# Patient Record
Sex: FEMALE | Race: WHITE | NOT HISPANIC OR LATINO | Employment: OTHER | URBAN - METROPOLITAN AREA
[De-identification: names, ages, dates, MRNs, and addresses within clinical notes are randomized per-mention and may not be internally consistent; named-entity substitution may affect disease eponyms.]

---

## 2017-09-15 ENCOUNTER — OUTSIDE FACILITY SERVICE (OUTPATIENT)
Dept: GASTROENTEROLOGY | Facility: CLINIC | Age: 77
End: 2017-09-15

## 2017-09-15 ENCOUNTER — LAB REQUISITION (OUTPATIENT)
Dept: LAB | Facility: HOSPITAL | Age: 77
End: 2017-09-15

## 2017-09-15 DIAGNOSIS — D12.5 BENIGN NEOPLASM OF SIGMOID COLON: ICD-10-CM

## 2017-09-15 PROCEDURE — 88305 TISSUE EXAM BY PATHOLOGIST: CPT | Performed by: INTERNAL MEDICINE

## 2017-09-15 PROCEDURE — 45385 COLONOSCOPY W/LESION REMOVAL: CPT | Performed by: INTERNAL MEDICINE

## 2017-09-18 LAB
CYTO UR: NORMAL
LAB AP CASE REPORT: NORMAL
LAB AP CLINICAL INFORMATION: NORMAL
Lab: NORMAL
PATH REPORT.FINAL DX SPEC: NORMAL
PATH REPORT.GROSS SPEC: NORMAL

## 2017-09-19 ENCOUNTER — TELEPHONE (OUTPATIENT)
Dept: GASTROENTEROLOGY | Facility: CLINIC | Age: 77
End: 2017-09-19

## 2017-09-19 NOTE — TELEPHONE ENCOUNTER
----- Message from Yobani Hawk MD sent at 9/18/2017  4:40 PM EDT -----  Let Mrs. Craig know there was one adenoma type polyp.  She should have a repeat exam in 5 years.  Thank you,  KATHY

## 2017-09-22 ENCOUNTER — TRANSCRIBE ORDERS (OUTPATIENT)
Dept: ADMINISTRATIVE | Facility: HOSPITAL | Age: 77
End: 2017-09-22

## 2017-09-22 DIAGNOSIS — Z12.31 VISIT FOR SCREENING MAMMOGRAM: Primary | ICD-10-CM

## 2017-10-03 ENCOUNTER — HOSPITAL ENCOUNTER (OUTPATIENT)
Dept: MAMMOGRAPHY | Facility: HOSPITAL | Age: 77
Discharge: HOME | End: 2017-10-03
Attending: OBSTETRICS & GYNECOLOGY | Admitting: OBSTETRICS & GYNECOLOGY

## 2017-10-03 DIAGNOSIS — Z12.31 VISIT FOR SCREENING MAMMOGRAM: ICD-10-CM

## 2017-10-03 PROCEDURE — 77063 BREAST TOMOSYNTHESIS BI: CPT

## 2017-10-03 PROCEDURE — 77063 BREAST TOMOSYNTHESIS BI: CPT | Performed by: RADIOLOGY

## 2017-10-03 PROCEDURE — G0202 SCR MAMMO BI INCL CAD: HCPCS | Performed by: RADIOLOGY

## 2017-10-03 PROCEDURE — G0202 SCR MAMMO BI INCL CAD: HCPCS

## 2017-10-12 ENCOUNTER — OFFICE VISIT (OUTPATIENT)
Dept: GYNECOLOGIC ONCOLOGY | Facility: CLINIC | Age: 77
End: 2017-10-12

## 2017-10-12 VITALS
TEMPERATURE: 98 F | DIASTOLIC BLOOD PRESSURE: 80 MMHG | HEIGHT: 64 IN | WEIGHT: 152 LBS | OXYGEN SATURATION: 94 % | SYSTOLIC BLOOD PRESSURE: 172 MMHG | HEART RATE: 70 BPM | BODY MASS INDEX: 25.95 KG/M2 | RESPIRATION RATE: 14 BRPM

## 2017-10-12 DIAGNOSIS — R87.610 PAPANICOLAOU SMEAR OF CERVIX WITH ATYPICAL SQUAMOUS CELLS OF UNDETERMINED SIGNIFICANCE (ASC-US): ICD-10-CM

## 2017-10-12 DIAGNOSIS — Z01.419 WELL FEMALE EXAM WITH ROUTINE GYNECOLOGICAL EXAM: ICD-10-CM

## 2017-10-12 PROCEDURE — G0101 CA SCREEN;PELVIC/BREAST EXAM: HCPCS | Performed by: NURSE PRACTITIONER

## 2017-10-12 RX ORDER — LEVOTHYROXINE SODIUM 88 UG/1
TABLET ORAL
COMMUNITY
Start: 2017-08-30 | End: 2020-01-02

## 2017-10-12 RX ORDER — VALSARTAN 160 MG/1
TABLET ORAL
COMMUNITY
Start: 2017-08-30 | End: 2019-08-06 | Stop reason: ALTCHOICE

## 2017-10-12 RX ORDER — AMLODIPINE BESYLATE 5 MG/1
TABLET ORAL
COMMUNITY
Start: 2017-09-25 | End: 2019-09-17
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
## 2018-08-23 ENCOUNTER — TRANSCRIBE ORDERS (OUTPATIENT)
Dept: ADMINISTRATIVE | Facility: HOSPITAL | Age: 78
End: 2018-08-23

## 2018-08-23 DIAGNOSIS — Z12.31 VISIT FOR SCREENING MAMMOGRAM: Primary | ICD-10-CM

## 2018-10-04 ENCOUNTER — HOSPITAL ENCOUNTER (OUTPATIENT)
Dept: MAMMOGRAPHY | Facility: HOSPITAL | Age: 78
Discharge: HOME | End: 2018-10-04
Admitting: NURSE PRACTITIONER

## 2018-10-04 DIAGNOSIS — Z12.31 VISIT FOR SCREENING MAMMOGRAM: ICD-10-CM

## 2018-10-04 PROCEDURE — 77067 SCR MAMMO BI INCL CAD: CPT

## 2018-10-04 PROCEDURE — 77067 SCR MAMMO BI INCL CAD: CPT | Performed by: RADIOLOGY

## 2018-10-04 PROCEDURE — 77063 BREAST TOMOSYNTHESIS BI: CPT | Performed by: RADIOLOGY

## 2018-10-04 PROCEDURE — 77063 BREAST TOMOSYNTHESIS BI: CPT

## 2018-10-31 ENCOUNTER — OFFICE VISIT (OUTPATIENT)
Dept: GYNECOLOGIC ONCOLOGY | Facility: CLINIC | Age: 78
End: 2018-10-31

## 2018-10-31 VITALS
DIASTOLIC BLOOD PRESSURE: 77 MMHG | BODY MASS INDEX: 27.82 KG/M2 | WEIGHT: 157 LBS | HEIGHT: 63 IN | RESPIRATION RATE: 16 BRPM | HEART RATE: 64 BPM | SYSTOLIC BLOOD PRESSURE: 178 MMHG | TEMPERATURE: 97.9 F | OXYGEN SATURATION: 96 %

## 2018-10-31 DIAGNOSIS — R87.610 PAPANICOLAOU SMEAR OF CERVIX WITH ATYPICAL SQUAMOUS CELLS OF UNDETERMINED SIGNIFICANCE (ASC-US): ICD-10-CM

## 2018-10-31 DIAGNOSIS — Z01.419 WELL WOMAN EXAM WITH ROUTINE GYNECOLOGICAL EXAM: Primary | ICD-10-CM

## 2018-10-31 DIAGNOSIS — N95.2 VAGINAL ATROPHY: ICD-10-CM

## 2018-10-31 PROCEDURE — G0101 CA SCREEN;PELVIC/BREAST EXAM: HCPCS | Performed by: NURSE PRACTITIONER

## 2018-10-31 RX ORDER — SODIUM CHLORIDE 5 %
1 OINTMENT (GRAM) OPHTHALMIC (EYE) NIGHTLY
COMMUNITY
End: 2019-11-11

## 2019-08-06 ENCOUNTER — OFFICE VISIT (OUTPATIENT)
Dept: ORTHOPEDIC SURGERY | Facility: CLINIC | Age: 79
End: 2019-08-06

## 2019-08-06 VITALS — HEIGHT: 63 IN | OXYGEN SATURATION: 99 % | BODY MASS INDEX: 26.45 KG/M2 | WEIGHT: 149.25 LBS | HEART RATE: 65 BPM

## 2019-08-06 DIAGNOSIS — M25.551 RIGHT HIP PAIN: Primary | ICD-10-CM

## 2019-08-06 PROCEDURE — 99204 OFFICE O/P NEW MOD 45 MIN: CPT | Performed by: ORTHOPAEDIC SURGERY

## 2019-08-06 RX ORDER — OLMESARTAN MEDOXOMIL 40 MG/1
40 TABLET ORAL DAILY
Refills: 0 | COMMUNITY
Start: 2019-07-16
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
## 2019-08-15 ENCOUNTER — OFFICE VISIT (OUTPATIENT)
Dept: ORTHOPEDIC SURGERY | Facility: CLINIC | Age: 79
End: 2019-08-15

## 2019-08-15 ENCOUNTER — HOSPITAL ENCOUNTER (OUTPATIENT)
Dept: MRI IMAGING | Facility: HOSPITAL | Age: 79
Discharge: HOME | End: 2019-08-15
Admitting: ORTHOPAEDIC SURGERY

## 2019-08-15 VITALS — WEIGHT: 149.03 LBS | BODY MASS INDEX: 26.41 KG/M2 | HEART RATE: 79 BPM | OXYGEN SATURATION: 97 % | HEIGHT: 63 IN

## 2019-08-15 DIAGNOSIS — M25.551 RIGHT HIP PAIN: ICD-10-CM

## 2019-08-15 DIAGNOSIS — M70.61 TROCHANTERIC BURSITIS OF RIGHT HIP: Primary | ICD-10-CM

## 2019-08-15 PROCEDURE — 73720 MRI LWR EXTREMITY W/O&W/DYE: CPT

## 2019-08-15 PROCEDURE — 20610 DRAIN/INJ JOINT/BURSA W/O US: CPT | Performed by: ORTHOPAEDIC SURGERY

## 2019-08-15 PROCEDURE — A9577 INJ MULTIHANCE: HCPCS | Performed by: ORTHOPAEDIC SURGERY

## 2019-08-15 PROCEDURE — 82565 ASSAY OF CREATININE: CPT

## 2019-08-15 PROCEDURE — 99213 OFFICE O/P EST LOW 20 MIN: CPT | Performed by: ORTHOPAEDIC SURGERY

## 2019-08-15 PROCEDURE — 0 GADOBENATE DIMEGLUMINE 529 MG/ML SOLUTION: Performed by: ORTHOPAEDIC SURGERY

## 2019-08-15 RX ORDER — TRIAMCINOLONE ACETONIDE 40 MG/ML
40 INJECTION, SUSPENSION INTRA-ARTICULAR; INTRAMUSCULAR
Status: COMPLETED | OUTPATIENT
Start: 2019-08-15 | End: 2019-08-15

## 2019-08-15 RX ADMIN — GADOBENATE DIMEGLUMINE 14 ML: 529 INJECTION, SOLUTION INTRAVENOUS at 09:58

## 2019-08-15 RX ADMIN — TRIAMCINOLONE ACETONIDE 40 MG: 40 INJECTION, SUSPENSION INTRA-ARTICULAR; INTRAMUSCULAR at 14:52
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
## 2019-08-15 NOTE — PROGRESS NOTES
Procedure   Large Joint Arthrocentesis: R greater trochanteric bursa  Date/Time: 8/15/2019 2:52 PM  Consent given by: patient  Site marked: site marked  Timeout: Immediately prior to procedure a time out was called to verify the correct patient, procedure, equipment, support staff and site/side marked as required   Supporting Documentation  Indications: pain   Procedure Details  Location: hip - R greater trochanteric bursa  Preparation: Patient was prepped and draped in the usual sterile fashion  Needle size: 22 G  Approach: lateral  Medications administered: 40 mg triamcinolone acetonide 40 MG/ML (5 cc Lidocaine 1% NDC: 03897-566-52; LOT: [id number]; EXP: 12/01/2021)  Patient tolerance: patient tolerated the procedure well with no immediate complications

## 2019-08-16 ENCOUNTER — APPOINTMENT (OUTPATIENT)
Dept: MRI IMAGING | Facility: HOSPITAL | Age: 79
End: 2019-08-16

## 2019-08-20 LAB — CREAT BLDA-MCNC: 0.9 MG/DL (ref 0.6–1.3)

## 2019-09-11 ENCOUNTER — TRANSCRIBE ORDERS (OUTPATIENT)
Dept: ADMINISTRATIVE | Facility: HOSPITAL | Age: 79
End: 2019-09-11

## 2019-09-11 DIAGNOSIS — Z12.31 VISIT FOR SCREENING MAMMOGRAM: Primary | ICD-10-CM

## 2019-09-17 ENCOUNTER — OFFICE VISIT (OUTPATIENT)
Dept: ORTHOPEDIC SURGERY | Facility: CLINIC | Age: 79
End: 2019-09-17

## 2019-09-17 VITALS — OXYGEN SATURATION: 97 % | HEIGHT: 63 IN | HEART RATE: 66 BPM | BODY MASS INDEX: 26.58 KG/M2 | WEIGHT: 150 LBS

## 2019-09-17 DIAGNOSIS — M25.551 RIGHT HIP PAIN: ICD-10-CM

## 2019-09-17 DIAGNOSIS — M70.61 TROCHANTERIC BURSITIS OF RIGHT HIP: Primary | ICD-10-CM

## 2019-09-17 DIAGNOSIS — M17.11 PRIMARY OSTEOARTHRITIS OF RIGHT KNEE: ICD-10-CM

## 2019-09-17 PROCEDURE — 99213 OFFICE O/P EST LOW 20 MIN: CPT | Performed by: ORTHOPAEDIC SURGERY

## 2019-09-17 RX ORDER — AMLODIPINE BESYLATE 10 MG/1
10 TABLET ORAL DAILY
Refills: 1 | COMMUNITY
Start: 2019-08-19
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
## 2019-11-11 ENCOUNTER — HOSPITAL ENCOUNTER (OUTPATIENT)
Dept: MAMMOGRAPHY | Facility: HOSPITAL | Age: 79
Discharge: HOME | End: 2019-11-11
Admitting: NURSE PRACTITIONER

## 2019-11-11 ENCOUNTER — OFFICE VISIT (OUTPATIENT)
Dept: GYNECOLOGIC ONCOLOGY | Facility: CLINIC | Age: 79
End: 2019-11-11

## 2019-11-11 VITALS
BODY MASS INDEX: 27.29 KG/M2 | DIASTOLIC BLOOD PRESSURE: 77 MMHG | HEART RATE: 70 BPM | OXYGEN SATURATION: 96 % | TEMPERATURE: 97.9 F | RESPIRATION RATE: 16 BRPM | SYSTOLIC BLOOD PRESSURE: 164 MMHG | WEIGHT: 154 LBS | HEIGHT: 63 IN

## 2019-11-11 DIAGNOSIS — Z01.419 WELL FEMALE EXAM WITH ROUTINE GYNECOLOGICAL EXAM: Primary | ICD-10-CM

## 2019-11-11 DIAGNOSIS — N95.2 VAGINAL ATROPHY: ICD-10-CM

## 2019-11-11 DIAGNOSIS — R87.610 PAPANICOLAOU SMEAR OF CERVIX WITH ATYPICAL SQUAMOUS CELLS OF UNDETERMINED SIGNIFICANCE (ASC-US): ICD-10-CM

## 2019-11-11 DIAGNOSIS — Z91.89 OTHER SPECIFIED PERSONAL RISK FACTORS, NOT ELSEWHERE CLASSIFIED: ICD-10-CM

## 2019-11-11 DIAGNOSIS — Z12.31 VISIT FOR SCREENING MAMMOGRAM: ICD-10-CM

## 2019-11-11 PROCEDURE — 77063 BREAST TOMOSYNTHESIS BI: CPT

## 2019-11-11 PROCEDURE — G0101 CA SCREEN;PELVIC/BREAST EXAM: HCPCS | Performed by: NURSE PRACTITIONER

## 2019-11-11 PROCEDURE — 77067 SCR MAMMO BI INCL CAD: CPT | Performed by: RADIOLOGY

## 2019-11-11 PROCEDURE — 77063 BREAST TOMOSYNTHESIS BI: CPT | Performed by: RADIOLOGY

## 2019-11-11 PROCEDURE — 77067 SCR MAMMO BI INCL CAD: CPT

## 2019-11-11 NOTE — PROGRESS NOTES
GYN ONCOLOGY ANNUAL WELL WOMAN VISIT      Kaylan Craig  [id number]  1940      Chief Complaint: Annual Exam (no complaints)        History of present illness:  Kaylan Craig is a 79 y.o. year old female who is here today for an annual exam. She has a personal history of abnormal pap smears and desires to continue annual paps. She recently had cornea transplant at  and has been receiving injections for her macular degeneration. She developed an infection in her left eye of unknown etiology, resolving with treatment. Otherwise, she is feeling very well today and has no gyn complaints. She denies vaginal bleeding, pelvic pain, changes in bowel or bladder function, new or concerning lesions, and breast problems. All her well woman screenings are UTD.           Obstetric History:  OB History      Para Term  AB Living    3 3 3     3    SAB TAB Ectopic Molar Multiple Live Births                        Menstrual History:     No LMP recorded (lmp unknown). Patient is postmenopausal.          Past Medical History:   Diagnosis Date   • Anemia     Blood Loss requiring blood transfusions during her hospitalizatoin at .    • Arthritis    • ASCUS favor benign    • Atrophic vaginitis    • Disease of thyroid gland    • Hypertension    • Leg pain    • Macular degeneration of both eyes     shots in both eyes monthly   • Seasonal allergies    • Wears glasses        Past Surgical History:   Procedure Laterality Date   • OTHER SURGICAL HISTORY      BTL   • OTHER SURGICAL HISTORY Bilateral     VAG LESION   • OTHER SURGICAL HISTORY      CORNEA X4, 2 IN LEFT AND 2 IN RIGHT   • OTHER SURGICAL HISTORY      Reported Corrective Surg to feet    • POSTERIOR REPAIR     • THYROIDECTOMY N/A 2016    Procedure: THYROIDECTOMY-TOTAL;  Surgeon: Nirmal JONES MD;  Location: [facility];  Service:    • TONSILLECTOMY AND ADENOIDECTOMY     • TOTAL ABDOMINAL HYSTERECTOMY WITH SALPINGO OOPHORECTOMY      For Uterine Sarcoma   "      MEDICATIONS: The current medication list was reviewed and reconciled.     Allergies:  has No Known Allergies.    Family History   Problem Relation Age of Onset   • Hypertension Father    • Prostate cancer Father    • Ovarian cancer Neg Hx    • Breast cancer Neg Hx        Health Maintenance:  Last mammogram was 10/4/2018. Last colonoscopy was 2017, with recommended follow-up in 5 year(s). Last DEXA was 9/2019. Last pap smear was 10/2018, results were  normal PAP..      Review of Systems   Constitutional: Negative for fatigue, fever and unexpected weight change.   HENT: Negative for congestion, ear pain, hearing loss, sinus pressure and trouble swallowing.    Eyes: Negative for visual disturbance.   Respiratory: Negative for cough, chest tightness, shortness of breath and wheezing.    Cardiovascular: Negative for chest pain, palpitations and leg swelling.   Gastrointestinal: Negative for abdominal distention, abdominal pain, constipation, diarrhea, nausea and vomiting.   Endocrine: Negative for cold intolerance, heat intolerance, polydipsia, polyphagia and polyuria.   Genitourinary: Negative for difficulty urinating, dyspareunia, dysuria, frequency, hematuria, pelvic pain, urgency, vaginal bleeding, vaginal discharge and vaginal pain.   Musculoskeletal: Negative for arthralgias, gait problem, joint swelling and myalgias.   Skin: Negative for color change, pallor and rash.   Neurological: Negative for dizziness, seizures, syncope, weakness, light-headedness, numbness and headaches.   Hematological: Negative for adenopathy. Does not bruise/bleed easily.   Psychiatric/Behavioral: Negative for agitation, confusion, sleep disturbance and suicidal ideas. The patient is not nervous/anxious.        Physical Exam  Vital Signs: /77   Pulse 70   Temp 97.9 °F (36.6 °C) (Temporal)   Resp 16   Ht 160 cm (63\")   Wt 69.9 kg (154 lb)   LMP  (LMP Unknown)   SpO2 96%   BMI 27.28 kg/m²   Pain Score    11/11/19 0944 "   PainSc: 0-No pain      General Appearance:  alert, cooperative, no apparent distress and appears stated age   Neurologic/Psychiatric: A&O x 3, gait steady, appropriate affect   HEENT:  Normocephalic, without obvious abnormality, mucous membranes moist   Neck: Supple, symmetrical, trachea midline, no adenopathy;  No thyromegaly, masses, or tenderness   Back:   Symmetric, no curvature, ROM normal, no CVA tenderness   Lungs:   Clear to auscultation bilaterally; respirations regular, even, and unlabored bilaterally   Heart:  Regular rate and rhythm, no murmurs appreciated   Breasts:  Symmetrical, no masses, no lesions and no nipple discharge   Abdomen:   Soft, non-tender, non-distended and no organomegaly   Lymph nodes: No cervical, supraclavicular, inguinal or axillary adenopathy noted   Extremities: Normal, atraumatic; no clubbing, cyanosis, or edema    Skin: No rashes, ulcers, or suspicious lesions noted   Pelvic: External Genitalia  atrophic, without lesions  Vagina  is pale, atrophic.   Cervix  normal, without lesions, no discharge, no CMT and pap smear obtained  Uterus  normal size, midposition, mobile and nontender  Ovaries  without palpable masses or fullness  Parametria  smooth  Rectovaginal  Female rectovaginal: confirms no masses or bleeding and Hemoccult negative       Procedure Note:  No notes on file    Assessment and Plan:    Kaylan was seen today for annual exam.    Diagnoses and all orders for this visit:    Well female exam with routine gynecological exam    Papanicolaou smear of cervix with atypical squamous cells of undetermined significance (ASC-US)  -     Pap IG, Rfx HPV ASCU; Future    Vaginal atrophy  -     conjugated estrogens (PREMARIN) 0.625 MG/GM vaginal cream; Insert  into the vagina 3 (Three) Times a Week.        Pap was done today. If she does not receive the results of the Pap within 2 weeks  time, she was instructed to call to find out the results.      She was encouraged to get yearly  mammograms.  She should report any palpable breast lump(s) or skin changes regardless of mammographic findings.  I explained to Kaylan that notification regarding her mammogram results will come from the center performing the study.  Our office will not be routinely calling with mammogram results.  It is her responsibility to make sure that the results from the mammogram are communicated to her by the breast center.  If she has any questions about the results, she is welcome to call our office anytime.    DEXA due 2022.    Colonoscopy due 2022.      Return to clinic in 1 year for Annual exam.      Karen Kaufman, APRN

## 2020-01-02 ENCOUNTER — OFFICE VISIT (OUTPATIENT)
Dept: ORTHOPEDIC SURGERY | Facility: CLINIC | Age: 80
End: 2020-01-02

## 2020-01-02 VITALS — BODY MASS INDEX: 26.82 KG/M2 | OXYGEN SATURATION: 98 % | HEIGHT: 63 IN | HEART RATE: 84 BPM | WEIGHT: 151.4 LBS

## 2020-01-02 DIAGNOSIS — M25.561 RIGHT KNEE PAIN, UNSPECIFIED CHRONICITY: Primary | ICD-10-CM

## 2020-01-02 DIAGNOSIS — M17.11 PRIMARY OSTEOARTHRITIS OF RIGHT KNEE: ICD-10-CM

## 2020-01-02 PROCEDURE — 99214 OFFICE O/P EST MOD 30 MIN: CPT | Performed by: ORTHOPAEDIC SURGERY

## 2020-01-02 RX ORDER — LEVOTHYROXINE SODIUM 0.1 MG/1
100 TABLET ORAL DAILY
COMMUNITY
Start: 2019-12-16

## 2020-01-02 NOTE — PROGRESS NOTES
"    [facility] [place] [facility] Note        Subjective     CC: Pain of the Right Knee (Right knee pain started in August 2019 when a porch swing fell and the hit her in the back of the leg. 5/10 on the pain scale. Going down the stairs makes the knee hurt worse. She has tired to icyhot but it has not helped. )      FLORENCIO Craig is a 79 y.o. female.  Patient is here for new problem today regarding her right knee that began in August 2019.  A porch swing fell and hit her in the back of the right knee.  Since that time, she has had 5 out of 10 pain on the knee.  She has difficulty going up and down stairs.  She is tried topical anti-inflammatories without a lot of improvement.  Pain is primarily on the inner part of the knee.    At a visit in August 2019, we injected her right trochanteric bursa which she says is better as a result.      ROS:    Constiutional:Pt denies fever, chills, nausea, or vomiting.  MSK:as above        Objective      Past Medical History  Past Medical History:   Diagnosis Date   • Anemia     Blood Loss requiring blood transfusions during her hospitalizatoin at .    • Arthritis    • ASCUS favor benign    • Atrophic vaginitis    • Disease of thyroid gland    • Hypertension    • Leg pain    • Macular degeneration of both eyes     shots in both eyes monthly   • Seasonal allergies    • Wears glasses          Physical Exam  Pulse 84   Ht 160 cm (62.99\")   Wt 68.7 kg (151 lb 6.4 oz)   LMP  (LMP Unknown)   SpO2 98%   BMI 26.83 kg/m²     Body mass index is 26.83 kg/m².    Patient is well nourished and well developed.        Ortho Exam  Peripheral Vascular:    Upper Extremity:   Inspection:  Left--no cyanotic nail beds Right--no cyanotic nail beds   Bilateral:  Pink nail beds with brisk capillary refill   Palpation:  Bilateral radial pulse normal    Musculoskeletal:  Global Assessment:  Overall assessment of Lower Extremity Muscle Strength and Tone:  Right quadriceps--5/5   Right " hamstrings--5/5       Right tibialis anterior--5/5  Right gastroc-soleus--5/5  Right EHL --5/5    Lower Extremity:  Knee/Patella:  No digital clubbing or cyanosis.    Examination of right knee reveals:  Normal deep tendon reflexes, coordination, strength, tone, sensation.  No known fractures or deformities.    Inspection and Palpation:  Right knee:  Tenderness:  Over the medial joint line and moderate severity  Effusion:  1+  Crepitus:  Positive  Pulses:  2+  Ecchymosis:  None  Warmth:  None     ROM:  Right:  Extension: 5    Flexion:120    Instability:    Right:  Lachman Test:  Negative, Varus stress test negative, Valgus stress test negative    Deformities/Malalignments/Discrepancies:    Left:  No deformities   Right:  Genu Varum    Functional Testing:  Aric's test:  Negative  Patella grind test:  Positive  Q-angle:  normal          Imaging/Labs/EMG Reviewed:  Imaging Results (Last 24 Hours)     Procedure Component Value Units Date/Time    XR Knee 4+ View Right [[id number]] Resulted:  01/02/20 1536     Updated:  01/02/20 1537    Narrative:       Knee X-Ray    Indication: Pain    Study:  Upright AP, Skiers, Lateral, and Sunrise views of Right knee(s)    Comparison: None    Findings:    Patient appears to have severe hypertrophic degenerative changes in the   medial compartment.    There are mild degenerative changes in the lateral compartment.    There are moderate changes in the patellofemoral compartment.    Patient has overall varus alignment.        Impression:   End-stage medial compartment and moderate patellofemoral compartment   degnerative changes of the knee             Assessment    Assessment:  1. Right knee pain, unspecified chronicity    2. Primary osteoarthritis of right knee        Plan:  1. Recommend over the counter anti-inflammatories for pain and/or swelling  2. Right knee arthritis--corticosteroid injection and a medial compartment offloading brace will be given today.  We will see her back  in about 6 to 8 weeks and if she is not a lot better, course of Visco supplementation will be initiated if the patient is interested.  We will go ahead and seek preauthorization today.      Santosh Xie MD  01/02/20  5:06 PM

## 2020-01-09 PROCEDURE — 20610 DRAIN/INJ JOINT/BURSA W/O US: CPT | Performed by: ORTHOPAEDIC SURGERY

## 2020-01-09 RX ORDER — LIDOCAINE HYDROCHLORIDE 10 MG/ML
3 INJECTION, SOLUTION EPIDURAL; INFILTRATION; INTRACAUDAL; PERINEURAL
Status: COMPLETED | OUTPATIENT
Start: 2020-01-09 | End: 2020-01-09

## 2020-01-09 RX ORDER — TRIAMCINOLONE ACETONIDE 40 MG/ML
40 INJECTION, SUSPENSION INTRA-ARTICULAR; INTRAMUSCULAR
Status: COMPLETED | OUTPATIENT
Start: 2020-01-09 | End: 2020-01-09

## 2020-01-09 RX ADMIN — TRIAMCINOLONE ACETONIDE 40 MG: 40 INJECTION, SUSPENSION INTRA-ARTICULAR; INTRAMUSCULAR at 12:43

## 2020-01-09 RX ADMIN — LIDOCAINE HYDROCHLORIDE 3 ML: 10 INJECTION, SOLUTION EPIDURAL; INFILTRATION; INTRACAUDAL; PERINEURAL at 12:43

## 2020-01-09 NOTE — PROGRESS NOTES
Procedure   Large Joint Arthrocentesis: R knee  Date/Time: 1/9/2020 12:43 PM  Consent given by: patient  Site marked: site marked  Timeout: Immediately prior to procedure a time out was called to verify the correct patient, procedure, equipment, support staff and site/side marked as required   Supporting Documentation  Indications: pain   Procedure Details  Location: knee - R knee  Preparation: Patient was prepped and draped in the usual sterile fashion  Needle size: 22 G  Approach: anterolateral  Medications administered: 3 mL lidocaine PF 1% 1 %; 40 mg triamcinolone acetonide 40 MG/ML  Patient tolerance: patient tolerated the procedure well with no immediate complications

## 2020-03-17 ENCOUNTER — OFFICE VISIT (OUTPATIENT)
Dept: ORTHOPEDIC SURGERY | Facility: CLINIC | Age: 80
End: 2020-03-17

## 2020-03-17 VITALS — HEIGHT: 63 IN | BODY MASS INDEX: 26.84 KG/M2 | HEART RATE: 87 BPM | WEIGHT: 151.46 LBS | OXYGEN SATURATION: 97 %

## 2020-03-17 DIAGNOSIS — M25.561 RIGHT KNEE PAIN, UNSPECIFIED CHRONICITY: Primary | ICD-10-CM

## 2020-03-17 DIAGNOSIS — M17.11 PRIMARY OSTEOARTHRITIS OF RIGHT KNEE: ICD-10-CM

## 2020-03-17 PROCEDURE — 20610 DRAIN/INJ JOINT/BURSA W/O US: CPT | Performed by: ORTHOPAEDIC SURGERY

## 2020-03-17 RX ORDER — LIDOCAINE HYDROCHLORIDE 10 MG/ML
3 INJECTION, SOLUTION EPIDURAL; INFILTRATION; INTRACAUDAL; PERINEURAL
Status: SHIPPED | OUTPATIENT
Start: 2020-03-17 | End: 2020-03-17

## 2020-03-17 RX ADMIN — LIDOCAINE HYDROCHLORIDE 3 ML: 10 INJECTION, SOLUTION EPIDURAL; INFILTRATION; INTRACAUDAL; PERINEURAL at 13:52

## 2020-03-17 NOTE — PROGRESS NOTES
"    [facility] [place] [facility] Note        Subjective     CC: Follow-up (10 weeks- Right knee pain, unspecified chronicity )      HPI    Kaylan Craig is a 79 y.o. female.  Patient returns the office today for follow-up of her right knee arthritis.  She is with her sister today.  She says the corticosteroid injection helped her but has worn off.  She continues to struggle with kneeling and squatting.      ROS:    Constiutional:Pt denies fever, chills, nausea, or vomiting.  MSK:as above        Objective      Past Medical History  Past Medical History:   Diagnosis Date   • Anemia     Blood Loss requiring blood transfusions during her hospitalizatoin at .    • Arthritis    • ASCUS favor benign    • Atrophic vaginitis    • Disease of thyroid gland    • Hypertension    • Leg pain    • Macular degeneration of both eyes     shots in both eyes monthly   • Seasonal allergies    • Wears glasses          Physical Exam  Pulse 87   Ht 160 cm (62.99\")   Wt 68.7 kg (151 lb 7.3 oz)   LMP  (LMP Unknown)   SpO2 97%   BMI 26.84 kg/m²     Body mass index is 26.84 kg/m².    Patient is well nourished and well developed.        Ortho Exam  1+ effusion  Genu varum  Medial joint line tenderness  Negative medial Aric    Imaging/Labs/EMG Reviewed:  Imaging Results (Last 24 Hours)     ** No results found for the last 24 hours. **          Assessment    Assessment:  1. Right knee pain, unspecified chronicity    2. Primary osteoarthritis of right knee        Plan:  1. Recommend over the counter anti-inflammatories for pain and/or swelling  2. Right knee arthritis--we have had a lengthy discussion with the patient and her sister today.  At this point, we have agreed to try course of Visco supplementation.  I will see her back in a week for Orthovisc No. 2.      Santosh Xie MD  03/17/20  13:45  "

## 2020-03-17 NOTE — PROGRESS NOTES
Procedure   Large Joint Arthrocentesis: R knee  Date/Time: 3/17/2020 1:52 PM  Consent given by: patient  Site marked: site marked  Timeout: Immediately prior to procedure a time out was called to verify the correct patient, procedure, equipment, support staff and site/side marked as required   Supporting Documentation  Indications: pain   Procedure Details  Location: knee - R knee  Preparation: Patient was prepped and draped in the usual sterile fashion  Needle size: 22 G  Approach: anterolateral  Medications administered: 30 mg Hyaluronan 30 MG/2ML; 3 mL lidocaine PF 1% 1 %  Patient tolerance: patient tolerated the procedure well with no immediate complications

## 2020-06-16 ENCOUNTER — CLINICAL SUPPORT (OUTPATIENT)
Dept: ORTHOPEDIC SURGERY | Facility: CLINIC | Age: 80
End: 2020-06-16

## 2020-06-16 DIAGNOSIS — M17.11 PRIMARY OSTEOARTHRITIS OF RIGHT KNEE: Primary | ICD-10-CM

## 2020-06-16 PROCEDURE — 20610 DRAIN/INJ JOINT/BURSA W/O US: CPT | Performed by: ORTHOPAEDIC SURGERY

## 2020-06-16 RX ORDER — THYROID 60 MG
TABLET ORAL
COMMUNITY
Start: 2020-06-15 | End: 2023-03-14 | Stop reason: DRUGHIGH

## 2020-06-16 RX ORDER — LIDOCAINE HYDROCHLORIDE 10 MG/ML
3 INJECTION, SOLUTION EPIDURAL; INFILTRATION; INTRACAUDAL; PERINEURAL
Status: COMPLETED | OUTPATIENT
Start: 2020-06-16 | End: 2020-06-16

## 2020-06-16 RX ORDER — FUROSEMIDE 40 MG/1
40 TABLET ORAL DAILY
COMMUNITY
Start: 2020-05-18

## 2020-06-16 RX ADMIN — LIDOCAINE HYDROCHLORIDE 3 ML: 10 INJECTION, SOLUTION EPIDURAL; INFILTRATION; INTRACAUDAL; PERINEURAL at 08:33

## 2020-06-16 NOTE — PROGRESS NOTES
Patient is seen today as on 3/17/2028, just before our COVID shutdown, we began a course of Orthovisc.  Obviously the course was interrupted and therefore we are restarting the course today.  Orthovisc No. 1 was injected through a superolateral approach to the right knee and tolerated well.  Follow-up in 1 week for Orthovisc No. 2.

## 2020-06-16 NOTE — PROGRESS NOTES
Procedure   Large Joint Arthrocentesis: R knee  Date/Time: 6/16/2020 8:33 AM  Consent given by: patient  Site marked: site marked  Timeout: Immediately prior to procedure a time out was called to verify the correct patient, procedure, equipment, support staff and site/side marked as required   Supporting Documentation  Indications: pain   Procedure Details  Location: knee - R knee  Preparation: Patient was prepped and draped in the usual sterile fashion  Needle size: 22 G  Approach: anterolateral  Medications administered: 30 mg Hyaluronan 30 MG/2ML; 3 mL lidocaine PF 1% 1 %  Patient tolerance: patient tolerated the procedure well with no immediate complications

## 2020-06-23 ENCOUNTER — CLINICAL SUPPORT (OUTPATIENT)
Dept: ORTHOPEDIC SURGERY | Facility: CLINIC | Age: 80
End: 2020-06-23

## 2020-06-23 DIAGNOSIS — M17.11 PRIMARY OSTEOARTHRITIS OF RIGHT KNEE: Primary | ICD-10-CM

## 2020-06-23 PROCEDURE — 20610 DRAIN/INJ JOINT/BURSA W/O US: CPT | Performed by: ORTHOPAEDIC SURGERY

## 2020-06-23 RX ORDER — LIDOCAINE HYDROCHLORIDE 10 MG/ML
3 INJECTION, SOLUTION EPIDURAL; INFILTRATION; INTRACAUDAL; PERINEURAL
Status: COMPLETED | OUTPATIENT
Start: 2020-06-23 | End: 2020-06-23

## 2020-06-23 RX ADMIN — LIDOCAINE HYDROCHLORIDE 3 ML: 10 INJECTION, SOLUTION EPIDURAL; INFILTRATION; INTRACAUDAL; PERINEURAL at 08:28

## 2020-06-23 NOTE — PROGRESS NOTES
Procedure   Large Joint Arthrocentesis: R knee  Date/Time: 6/23/2020 8:28 AM  Consent given by: patient  Site marked: site marked  Timeout: Immediately prior to procedure a time out was called to verify the correct patient, procedure, equipment, support staff and site/side marked as required   Supporting Documentation  Indications: pain   Procedure Details  Location: knee - R knee  Preparation: Patient was prepped and draped in the usual sterile fashion  Needle size: 22 G  Approach: anterolateral  Medications administered: 30 mg Hyaluronan 30 MG/2ML; 3 mL lidocaine PF 1% 1 %  Patient tolerance: patient tolerated the procedure well with no immediate complications

## 2020-06-23 NOTE — PROGRESS NOTES
Patient is here for Orthovisc No. 2 to the right knee.  This was injected there a superior lateral approach and tolerated well.  Follow-up in 1 week for Orthovisc No. 3.

## 2020-06-30 ENCOUNTER — CLINICAL SUPPORT (OUTPATIENT)
Dept: ORTHOPEDIC SURGERY | Facility: CLINIC | Age: 80
End: 2020-06-30

## 2020-06-30 DIAGNOSIS — M17.11 PRIMARY OSTEOARTHRITIS OF RIGHT KNEE: Primary | ICD-10-CM

## 2020-06-30 PROCEDURE — 20610 DRAIN/INJ JOINT/BURSA W/O US: CPT | Performed by: ORTHOPAEDIC SURGERY

## 2020-06-30 RX ORDER — LIDOCAINE HYDROCHLORIDE 10 MG/ML
3 INJECTION, SOLUTION EPIDURAL; INFILTRATION; INTRACAUDAL; PERINEURAL
Status: COMPLETED | OUTPATIENT
Start: 2020-06-30 | End: 2020-06-30

## 2020-06-30 RX ADMIN — LIDOCAINE HYDROCHLORIDE 3 ML: 10 INJECTION, SOLUTION EPIDURAL; INFILTRATION; INTRACAUDAL; PERINEURAL at 08:27

## 2020-06-30 NOTE — PROGRESS NOTES
Patient is here for Orthovisc No. 3 to the right knee.  This was injected through a superior lateral approach and tolerated well.  Follow-up in 6 months or sooner if necessary.

## 2020-06-30 NOTE — PROGRESS NOTES
Procedure   Large Joint Arthrocentesis: R knee  Date/Time: 6/30/2020 8:27 AM  Consent given by: patient  Site marked: site marked  Timeout: Immediately prior to procedure a time out was called to verify the correct patient, procedure, equipment, support staff and site/side marked as required   Supporting Documentation  Indications: pain   Procedure Details  Location: knee - R knee  Preparation: Patient was prepped and draped in the usual sterile fashion  Needle size: 22 G  Approach: anterolateral  Medications administered: 30 mg Hyaluronan 30 MG/2ML; 3 mL lidocaine PF 1% 1 %  Patient tolerance: patient tolerated the procedure well with no immediate complications

## 2020-10-02 ENCOUNTER — TRANSCRIBE ORDERS (OUTPATIENT)
Dept: ADMINISTRATIVE | Facility: HOSPITAL | Age: 80
End: 2020-10-02

## 2020-10-02 ENCOUNTER — TELEPHONE (OUTPATIENT)
Dept: GYNECOLOGIC ONCOLOGY | Facility: CLINIC | Age: 80
End: 2020-10-02

## 2020-10-02 DIAGNOSIS — Z12.31 VISIT FOR SCREENING MAMMOGRAM: Primary | ICD-10-CM

## 2020-11-12 ENCOUNTER — OFFICE VISIT (OUTPATIENT)
Dept: GYNECOLOGIC ONCOLOGY | Facility: CLINIC | Age: 80
End: 2020-11-12

## 2020-11-12 VITALS
HEART RATE: 58 BPM | DIASTOLIC BLOOD PRESSURE: 71 MMHG | RESPIRATION RATE: 15 BRPM | OXYGEN SATURATION: 97 % | HEIGHT: 63 IN | SYSTOLIC BLOOD PRESSURE: 157 MMHG | TEMPERATURE: 97.5 F | BODY MASS INDEX: 26.58 KG/M2 | WEIGHT: 150 LBS

## 2020-11-12 DIAGNOSIS — Z87.42 HISTORY OF ABNORMAL CERVICAL PAP SMEAR: ICD-10-CM

## 2020-11-12 DIAGNOSIS — Z01.419 WELL WOMAN EXAM WITH ROUTINE GYNECOLOGICAL EXAM: Primary | ICD-10-CM

## 2020-11-12 PROCEDURE — G0101 CA SCREEN;PELVIC/BREAST EXAM: HCPCS | Performed by: NURSE PRACTITIONER

## 2020-11-12 NOTE — PROGRESS NOTES
GYN ONCOLOGY ANNUAL WELL WOMAN VISIT      Kaylan Craig  [id number]  1940      Chief Complaint: Annual Exam (no complaints)        History of present illness:  Kaylan Craig is a 80 y.o. year old female who is here today for an annual exam. She has a personal history of abnormal pap smears. She is feeling very well today and has no gyn complaints. She denies vaginal bleeding, pelvic pain, changes in bowel or bladder function, new or concerning lesions, and breast problems. All her well woman screenings are UTD.         Obstetric History:  OB History        3    Para   3    Term   3            AB        Living   3       SAB        TAB        Ectopic        Molar        Multiple        Live Births                   Menstrual History:     No LMP recorded (lmp unknown). Patient has had a hysterectomy.          Past Medical History:   Diagnosis Date   • Anemia     Blood Loss requiring blood transfusions during her hospitalizatoin at .    • Arthritis    • ASCUS favor benign    • Atrophic vaginitis    • Disease of thyroid gland    • Hypertension    • Leg pain    • Macular degeneration of both eyes     shots in both eyes monthly   • Seasonal allergies    • Wears glasses        Past Surgical History:   Procedure Laterality Date   • OTHER SURGICAL HISTORY      BTL   • OTHER SURGICAL HISTORY Bilateral     VAG LESION   • OTHER SURGICAL HISTORY      CORNEA X4, 2 IN LEFT AND 2 IN RIGHT   • OTHER SURGICAL HISTORY      Reported Corrective Surg to feet    • POSTERIOR REPAIR     • THYROIDECTOMY N/A 2016    Procedure: THYROIDECTOMY-TOTAL;  Surgeon: Nirmal JONES MD;  Location: [facility];  Service:    • TONSILLECTOMY AND ADENOIDECTOMY     • TOTAL ABDOMINAL HYSTERECTOMY WITH SALPINGO OOPHORECTOMY      For Uterine Sarcoma        MEDICATIONS: The current medication list was reviewed and reconciled.     Allergies:  has No Known Allergies.    Family History   Problem Relation Age of Onset   • Hypertension Father   "  • Prostate cancer Father    • Ovarian cancer Neg Hx    • Breast cancer Neg Hx        Health Maintenance:  Last mammogram was 11/11/2019. Last colonoscopy was 2017, with recommended follow-up in 5 year(s). Last DEXA was 9/2019. Last pap smear was 11/11/2019, results were  normal PAP.      Review of Systems   Constitutional: Negative for fatigue, fever and unexpected weight change.   HENT: Negative for congestion, ear pain, hearing loss, sinus pressure and trouble swallowing.    Eyes: Negative for visual disturbance.   Respiratory: Negative for cough, chest tightness, shortness of breath and wheezing.    Cardiovascular: Negative for chest pain, palpitations and leg swelling.   Gastrointestinal: Negative for abdominal distention, abdominal pain, constipation, diarrhea, nausea and vomiting.   Endocrine: Negative for cold intolerance, heat intolerance, polydipsia, polyphagia and polyuria.   Genitourinary: Negative for difficulty urinating, dyspareunia, dysuria, frequency, hematuria, pelvic pain, urgency, vaginal bleeding, vaginal discharge and vaginal pain.   Musculoskeletal: Negative for arthralgias, gait problem, joint swelling and myalgias.   Skin: Negative for color change, pallor and rash.   Neurological: Negative for dizziness, seizures, syncope, weakness, light-headedness, numbness and headaches.   Hematological: Negative for adenopathy. Does not bruise/bleed easily.   Psychiatric/Behavioral: Negative for agitation, confusion, sleep disturbance and suicidal ideas. The patient is not nervous/anxious.        Physical Exam  Vital Signs: /71   Pulse 58   Temp 97.5 °F (36.4 °C) (Temporal)   Resp 15   Ht 160 cm (62.99\")   Wt 68 kg (150 lb)   LMP  (LMP Unknown)   SpO2 97%   BMI 26.58 kg/m²   Pain Score    11/12/20 1000   PainSc: 0-No pain      General Appearance:  alert, cooperative, no apparent distress and appears stated age   Neurologic/Psychiatric: A&O x 3, gait steady, appropriate affect   HEENT:  " Normocephalic, without obvious abnormality, mucous membranes moist   Neck: Supple, symmetrical, trachea midline, no adenopathy;  No thyromegaly, masses, or tenderness   Back:   Symmetric, no curvature, ROM normal, no CVA tenderness   Lungs:   Clear to auscultation bilaterally; respirations regular, even, and unlabored bilaterally   Heart:  Regular rate and rhythm, no murmurs appreciated   Breasts:  Symmetrical, no masses, no lesions and no nipple discharge   Abdomen:   Soft, non-tender, non-distended and no organomegaly   Lymph nodes: No cervical, supraclavicular, inguinal or axillary adenopathy noted   Extremities: Normal, atraumatic; no clubbing, cyanosis, or edema    Skin: No rashes, ulcers, or suspicious lesions noted   Pelvic: External Genitalia  atrophic, without lesions  Vagina  is pale, atrophic.   Cervix  normal, without lesions, no discharge, no CMT and pap smear obtained  Uterus  normal size, midposition, mobile and nontender  Ovaries  without palpable masses or fullness  Parametria  smooth  Rectovaginal  Female rectovaginal: confirms no masses or bleeding and Hemoccult negative       Procedure Note:  No notes on file    Assessment and Plan:    Diagnoses and all orders for this visit:    Well woman exam with routine gynecological exam    History of abnormal cervical Pap smear          Pap was done today. If she does not receive the results of the Pap within 2 weeks  time, she was instructed to call to find out the results.      She was encouraged to get yearly to every 2 year mammograms.  She should report any palpable breast lump(s) or skin changes regardless of mammographic findings.  I explained to Kaylan that notification regarding her mammogram results will come from the center performing the study.  Our office will not be routinely calling with mammogram results.  It is her responsibility to make sure that the results from the mammogram are communicated to her by the breast center.  If she has any  questions about the results, she is welcome to call our office anytime.    DEXA due 2022.    Colonoscopy due 2022.      Return to clinic PRN due to age and benign history, pending normal pap today.       NAJMA Augustin

## 2021-01-05 ENCOUNTER — OFFICE VISIT (OUTPATIENT)
Dept: ORTHOPEDIC SURGERY | Facility: CLINIC | Age: 81
End: 2021-01-05

## 2021-01-05 VITALS — HEIGHT: 63 IN | HEART RATE: 69 BPM | OXYGEN SATURATION: 98 % | WEIGHT: 141 LBS | BODY MASS INDEX: 24.98 KG/M2

## 2021-01-05 DIAGNOSIS — M17.11 PRIMARY OSTEOARTHRITIS OF RIGHT KNEE: Primary | ICD-10-CM

## 2021-01-05 PROCEDURE — 99213 OFFICE O/P EST LOW 20 MIN: CPT | Performed by: ORTHOPAEDIC SURGERY

## 2021-01-05 NOTE — PROGRESS NOTES
"    [facility] [place] [facility] Note        Subjective     CC: Follow-up (6 month f/u; Primary osteoarthritis of right knee (Orthovisc series completed 6/30/20))      FLORENCIO Craig is a 80 y.o. female.  Patient here today for follow-up of her right knee arthritis.  She completed the Orthovisc on 6/30/2020.  She tells me she only has pain when she goes up and down a certain set of steps at her house.  Otherwise, she has been busy and not doing her exercises but tells me her knee pain is under control.    Overall, patient's symptoms are stable.    ROS:    Constiutional:Pt denies fever, chills, nausea, or vomiting.  MSK:as above        Objective      Past Medical History  Past Medical History:   Diagnosis Date   • Anemia     Blood Loss requiring blood transfusions during her hospitalizatoin at .    • Arthritis    • ASCUS favor benign    • Atrophic vaginitis    • Disease of thyroid gland    • Hypertension    • Leg pain    • Macular degeneration of both eyes     shots in both eyes monthly   • Seasonal allergies    • Wears glasses          Physical Exam  Pulse 69   Ht 160 cm (62.99\")   Wt 64 kg (141 lb)   LMP  (LMP Unknown)   SpO2 98%   BMI 24.98 kg/m²     Body mass index is 24.98 kg/m².    Patient is well nourished and well developed.        Ortho Exam  Peripheral Vascular:    Upper Extremity:   Inspection:  Left--no cyanotic nail beds Right--no cyanotic nail beds   Bilateral:  Pink nail beds with brisk capillary refill   Palpation:  Bilateral radial pulse normal    Musculoskeletal:  Global Assessment:  Overall assessment of Lower Extremity Muscle Strength and Tone:  Right quadriceps--5/5   Right hamstrings--5/5       Right tibialis anterior--5/5  Right gastroc-soleus--5/5  Right EHL --5/5    Lower Extremity:  Knee/Patella:  No digital clubbing or cyanosis.    Examination of right knee reveals:  Normal deep tendon reflexes, coordination, strength, tone, sensation.  No known fractures or " deformities.    Inspection and Palpation:  Right knee:  Tenderness:  Over the medial joint line and moderate severity  Effusion:  1+  Crepitus:  Positive  Pulses:  2+  Ecchymosis:  None  Warmth:  None     ROM:  Right:  Extension: 5    Flexion:120    Instability:    Right:  Lachman Test:  Negative, Varus stress test negative, Valgus stress test negative    Deformities/Malalignments/Discrepancies:    Left:  No deformities   Right:  Genu Varum    Functional Testing:  Aric's test:  Negative  Patella grind test:  Positive  Q-angle:  normal          Imaging/Labs/EMG Reviewed:  Imaging Results (Last 24 Hours)     Procedure Component Value Units Date/Time    XR Knee 4+ View Right [[id number]] Resulted: 01/05/21 0903     Updated: 01/05/21 0903    Narrative:      Knee X-Ray    Indication: Pain    Study:  Upright AP, Skiers, Lateral, and Sunrise views of Right knee(s)    Comparison: None    Findings:    Patient appears to have end-stage hypertrophic degenerative changes in the   medial compartment.    There are mild early moderate degenerative changes in the lateral   compartment.    There are moderate changes in the patellofemoral compartment.    Patient has overall varus alignment.        Impression:   End-stage medial compartment and moderate patellofemoral compartment   degnerative changes of the knee               Assessment    Assessment:  1. Primary osteoarthritis of right knee        Plan:  1. Recommend over the counter anti-inflammatories for pain and/or swelling  2. Osteoarthritis right knee--patient is doing well currently.  Hold off on any treatment for now.  Follow-up in 6 months we will reassess.  She can call if she experiences a flareup and we can work her in.  She is very busy taking care of her grandson and family.      Santosh Xie MD  01/05/21  09:04 EST      Dragon disclaimer:  Much of this encounter note is an electronic transcription/translation of spoken language to printed text. The  electronic translation of spoken language may permit erroneous, or at times, nonsensical words or phrases to be inadvertently transcribed; Although I have reviewed the note for such errors, some may still exist.

## 2021-01-21 ENCOUNTER — HOSPITAL ENCOUNTER (OUTPATIENT)
Dept: MAMMOGRAPHY | Facility: HOSPITAL | Age: 81
Discharge: HOME | End: 2021-01-21
Admitting: NURSE PRACTITIONER

## 2021-01-21 DIAGNOSIS — Z12.31 VISIT FOR SCREENING MAMMOGRAM: ICD-10-CM

## 2021-01-21 PROCEDURE — 77063 BREAST TOMOSYNTHESIS BI: CPT

## 2021-01-21 PROCEDURE — 77063 BREAST TOMOSYNTHESIS BI: CPT | Performed by: RADIOLOGY

## 2021-01-21 PROCEDURE — 77067 SCR MAMMO BI INCL CAD: CPT

## 2021-01-21 PROCEDURE — 77067 SCR MAMMO BI INCL CAD: CPT | Performed by: RADIOLOGY

## 2021-07-06 ENCOUNTER — OFFICE VISIT (OUTPATIENT)
Dept: ORTHOPEDIC SURGERY | Facility: CLINIC | Age: 81
End: 2021-07-06

## 2021-07-06 VITALS
HEIGHT: 63 IN | DIASTOLIC BLOOD PRESSURE: 80 MMHG | BODY MASS INDEX: 25.34 KG/M2 | WEIGHT: 143 LBS | HEART RATE: 80 BPM | SYSTOLIC BLOOD PRESSURE: 140 MMHG

## 2021-07-06 DIAGNOSIS — M17.11 PRIMARY OSTEOARTHRITIS OF RIGHT KNEE: Primary | ICD-10-CM

## 2021-07-06 PROCEDURE — 99213 OFFICE O/P EST LOW 20 MIN: CPT | Performed by: ORTHOPAEDIC SURGERY

## 2021-07-06 NOTE — PROGRESS NOTES
"    [facility] [place] [facility] Note        Subjective     CC: Follow-up (6 months- Primary osteoarthritis of right knee)      HPI    Kaylan Craig is a 80 y.o. female.  Patient is here today for follow-up of her right knee arthritis.  She completed a course of Orthovisc in June 2020 and still seems to be doing quite well and her knee bothers her only when she kneels down to squat or when she goes up a very steep step.    Overall, patient's symptoms are much better.    ROS:    Constiutional:Pt denies fever, chills, nausea, or vomiting.  MSK:as above        Objective      Past Medical History  Past Medical History:   Diagnosis Date   • Anemia     Blood Loss requiring blood transfusions during her hospitalizatoin at .    • Arthritis    • ASCUS favor benign    • Atrophic vaginitis    • Disease of thyroid gland    • Hypertension    • Leg pain    • Macular degeneration of both eyes     shots in both eyes monthly   • Seasonal allergies    • Wears glasses          Physical Exam  /80   Pulse 80   Ht 160 cm (62.99\")   Wt 64.9 kg (143 lb)   LMP  (LMP Unknown)   BMI 25.34 kg/m²     Body mass index is 25.34 kg/m².    Patient is well nourished and well developed.        Ortho Exam      Musculoskeletal:  Global Assessment:  Overall assessment of Lower Extremity Muscle Strength and Tone:  Right quadriceps--5/5   Right hamstrings--5/5       Right tibialis anterior--5/5  Right gastroc-soleus--5/5  Right EHL --5/5    Lower Extremity:    Inspection and Palpation:  Right knee:  Tenderness:  Over the medial joint line and moderate severity  Effusion:  1+  Crepitus:  Positive  Pulses:  2+  Ecchymosis:  None  Warmth:  None     ROM:  Right:  Extension: 5    Flexion:120    Instability:    Right:  Lachman Test:  Negative   Varus stress test negative   Valgus stress test negative    Deformities/Malalignments/Discrepancies:    Left:  No deformities   Right:  Genu Varum    Functional Testing:  Aric's test:  " Negative  Patella grind test:  Positive  Q-angle:  normal          Imaging/Labs/EMG Reviewed:  Imaging Results (Last 24 Hours)     ** No results found for the last 24 hours. **            Assessment    Assessment:  1. Primary osteoarthritis of right knee        Plan:  1. Recommend over the counter anti-inflammatories for pain and/or swelling  2. Osteoarthritis right knee--severe medial compartment and moderate to severe patellofemoral compartment disease.  Patient is doing quite well overall.  We will follow-up in 3 months we will see how she is doing and whether or not we need to pursue further modalities.      Santosh Xie MD  07/06/21  08:59 EDT      Dragon disclaimer:  Much of this encounter note is an electronic transcription/translation of spoken language to printed text. The electronic translation of spoken language may permit erroneous, or at times, nonsensical words or phrases to be inadvertently transcribed; Although I have reviewed the note for such errors, some may still exist.

## 2021-09-01 ENCOUNTER — TELEPHONE (OUTPATIENT)
Dept: ORTHOPEDIC SURGERY | Facility: CLINIC | Age: 81
End: 2021-09-01

## 2021-09-01 NOTE — TELEPHONE ENCOUNTER
Provider: DR ASH  Caller: KATELYN ESQUEDA  Relationship to Patient: SELF    Phone Number: [phone]  Reason for Call: THE PATIENT WOULD LIKE A GEL INJECTION IN HER RIGHT KNEE

## 2021-09-02 DIAGNOSIS — M17.11 PRIMARY OSTEOARTHRITIS OF RIGHT KNEE: Primary | ICD-10-CM

## 2021-09-28 ENCOUNTER — OFFICE VISIT (OUTPATIENT)
Dept: ORTHOPEDIC SURGERY | Facility: CLINIC | Age: 81
End: 2021-09-28

## 2021-09-28 DIAGNOSIS — M17.11 PRIMARY OSTEOARTHRITIS OF RIGHT KNEE: Primary | ICD-10-CM

## 2021-09-28 PROCEDURE — 20610 DRAIN/INJ JOINT/BURSA W/O US: CPT | Performed by: ORTHOPAEDIC SURGERY

## 2021-09-28 RX ORDER — LIDOCAINE HYDROCHLORIDE 10 MG/ML
3 INJECTION, SOLUTION EPIDURAL; INFILTRATION; INTRACAUDAL; PERINEURAL
Status: COMPLETED | OUTPATIENT
Start: 2021-09-28 | End: 2021-09-28

## 2021-09-28 RX ADMIN — LIDOCAINE HYDROCHLORIDE 3 ML: 10 INJECTION, SOLUTION EPIDURAL; INFILTRATION; INTRACAUDAL; PERINEURAL at 11:36

## 2021-09-28 NOTE — PROGRESS NOTES
Procedure Note:    I discussed with the patient the potential benefits of performing a therapeutic injections as well as potential risks including but not limited to infection, swelling, pain, bleeding, bruising, nerve/vessel damage, skin color changes, transient elevation in blood glucose levels, and fat atrophy. After informed consent and after the areas were prepped with chlorhexadine soap, ethyl chloride was used to numb the skin. Via the superiorlateral approach, 3mL of 1% lidocaine followed by Orthovisc No. 1 were each injected into the right knee joint. The patient tolerated the procedure well. There were no complications. A sterile dressing was placed over the injection sites.

## 2021-09-28 NOTE — PROGRESS NOTES
Procedure   Large Joint Arthrocentesis: R knee  Date/Time: 9/28/2021 11:36 AM  Consent given by: patient  Site marked: site marked  Timeout: Immediately prior to procedure a time out was called to verify the correct patient, procedure, equipment, support staff and site/side marked as required   Supporting Documentation  Indications: pain   Procedure Details  Location: knee - R knee  Preparation: Patient was prepped and draped in the usual sterile fashion  Needle size: 22 G  Approach: anterolateral  Medications administered: 30 mg Hyaluronan 30 MG/2ML; 3 mL lidocaine PF 1% 1 %  Patient tolerance: patient tolerated the procedure well with no immediate complications

## 2021-10-05 ENCOUNTER — CLINICAL SUPPORT (OUTPATIENT)
Dept: ORTHOPEDIC SURGERY | Facility: CLINIC | Age: 81
End: 2021-10-05

## 2021-10-05 DIAGNOSIS — M17.11 PRIMARY OSTEOARTHRITIS OF RIGHT KNEE: Primary | ICD-10-CM

## 2021-10-05 PROCEDURE — 20610 DRAIN/INJ JOINT/BURSA W/O US: CPT | Performed by: ORTHOPAEDIC SURGERY

## 2021-10-05 RX ORDER — LIDOCAINE HYDROCHLORIDE 10 MG/ML
3 INJECTION, SOLUTION EPIDURAL; INFILTRATION; INTRACAUDAL; PERINEURAL
Status: COMPLETED | OUTPATIENT
Start: 2021-10-05 | End: 2021-10-05

## 2021-10-05 RX ADMIN — LIDOCAINE HYDROCHLORIDE 3 ML: 10 INJECTION, SOLUTION EPIDURAL; INFILTRATION; INTRACAUDAL; PERINEURAL at 09:47

## 2021-10-05 NOTE — PROGRESS NOTES
Procedure   Large Joint Arthrocentesis: R knee  Date/Time: 10/5/2021 9:47 AM  Consent given by: patient  Site marked: site marked  Timeout: Immediately prior to procedure a time out was called to verify the correct patient, procedure, equipment, support staff and site/side marked as required   Supporting Documentation  Indications: pain   Procedure Details  Location: knee - R knee  Preparation: Patient was prepped and draped in the usual sterile fashion  Needle size: 22 G  Approach: anterolateral  Medications administered: 30 mg Hyaluronan 30 MG/2ML; 3 mL lidocaine PF 1% 1 %  Patient tolerance: patient tolerated the procedure well with no immediate complications

## 2021-10-12 ENCOUNTER — CLINICAL SUPPORT (OUTPATIENT)
Dept: ORTHOPEDIC SURGERY | Facility: CLINIC | Age: 81
End: 2021-10-12

## 2021-10-12 DIAGNOSIS — M17.11 PRIMARY OSTEOARTHRITIS OF RIGHT KNEE: Primary | ICD-10-CM

## 2021-10-12 PROCEDURE — 20610 DRAIN/INJ JOINT/BURSA W/O US: CPT | Performed by: ORTHOPAEDIC SURGERY

## 2021-10-12 NOTE — PROGRESS NOTES
Procedure   Large Joint Arthrocentesis: R knee  Date/Time: 10/12/2021 9:59 AM  Consent given by: patient  Site marked: site marked  Timeout: Immediately prior to procedure a time out was called to verify the correct patient, procedure, equipment, support staff and site/side marked as required   Supporting Documentation  Indications: pain   Procedure Details  Location: knee - R knee  Preparation: Patient was prepped and draped in the usual sterile fashion  Needle size: 22 G  Approach: anterolateral  Medications administered: 30 mg Hyaluronan 30 MG/2ML  Patient tolerance: patient tolerated the procedure well with no immediate complications

## 2021-10-12 NOTE — PROGRESS NOTES
Procedure Note:    I discussed with the patient the potential benefits of performing a therapeutic injections as well as potential risks including but not limited to infection, swelling, pain, bleeding, bruising, nerve/vessel damage, skin color changes, transient elevation in blood glucose levels, and fat atrophy. After informed consent and after the areas were prepped with chlorhexadine soap, ethyl chloride was used to numb the skin. Via the superiorlateral approach, 3mL of 1% lidocaine followed by Orthovisc No. 3 were each injected into the right knee joint. The patient tolerated the procedure well. There were no complications. A sterile dressing was placed over the injection sites.      Follow-up in 6 months or sooner if necessary

## 2021-11-02 ENCOUNTER — HOSPITAL ENCOUNTER (OUTPATIENT)
Dept: CT IMAGING | Facility: HOSPITAL | Age: 81
Discharge: HOME | End: 2021-11-02
Admitting: ORTHOPAEDIC SURGERY

## 2021-11-02 ENCOUNTER — OFFICE VISIT (OUTPATIENT)
Dept: ORTHOPEDIC SURGERY | Facility: CLINIC | Age: 81
End: 2021-11-02

## 2021-11-02 VITALS
HEART RATE: 73 BPM | DIASTOLIC BLOOD PRESSURE: 68 MMHG | HEIGHT: 63 IN | SYSTOLIC BLOOD PRESSURE: 162 MMHG | WEIGHT: 148 LBS | BODY MASS INDEX: 26.22 KG/M2

## 2021-11-02 DIAGNOSIS — S42.202A CLOSED FRACTURE OF PROXIMAL END OF LEFT HUMERUS, UNSPECIFIED FRACTURE MORPHOLOGY, INITIAL ENCOUNTER: Primary | ICD-10-CM

## 2021-11-02 DIAGNOSIS — S42.202A CLOSED FRACTURE OF PROXIMAL END OF LEFT HUMERUS, UNSPECIFIED FRACTURE MORPHOLOGY, INITIAL ENCOUNTER: ICD-10-CM

## 2021-11-02 PROCEDURE — 73200 CT UPPER EXTREMITY W/O DYE: CPT

## 2021-11-02 PROCEDURE — 99214 OFFICE O/P EST MOD 30 MIN: CPT | Performed by: ORTHOPAEDIC SURGERY

## 2021-11-02 RX ORDER — ONDANSETRON 4 MG/1
TABLET, ORALLY DISINTEGRATING ORAL
Status: ON HOLD | COMMUNITY
Start: 2021-10-31 | End: 2022-07-25

## 2021-11-02 RX ORDER — HYDROCODONE BITARTRATE AND ACETAMINOPHEN 5; 325 MG/1; MG/1
1 TABLET ORAL EVERY 6 HOURS PRN
Qty: 30 TABLET | Refills: 0 | Status: SHIPPED | OUTPATIENT
Start: 2021-11-02 | End: 2022-07-12

## 2021-11-02 RX ORDER — HYDROCODONE BITARTRATE AND ACETAMINOPHEN 5; 325 MG/1; MG/1
TABLET ORAL
COMMUNITY
Start: 2021-10-31 | End: 2022-07-12

## 2021-11-02 NOTE — PROGRESS NOTES
[facility] [place] [facility] Note        Subjective     Pain of the Left Shoulder (DOI 10/31/21)      HPI    Kaylan Craig is a 81 y.o. female who presents with new problem of: left shoulder pain.  Onset: mechanical fall. The issue has been ongoing for 3 day(s). Pain is a 9/10 on the pain scale. Pain is described as burning. Associated symptoms include pain and swelling. The pain is worse with sleeping and lying on affected side; ice improve the pain. Previous treatments have included: bracing.    I have reviewed the following portions of the patient's history:History of Present Illness and review of systems.        Patient here today for new problem today regarding her left shoulder.  She was walking up the basement steps and lost her balance and fell and landed on her left shoulder.  Date of injury was 3 days ago.  She is here with her Sister Daiana today.  She went to [facility].    Past Medical History:   Diagnosis Date   • Anemia     Blood Loss requiring blood transfusions during her hospitalizatoin at .    • Arthritis    • ASCUS favor benign    • Atrophic vaginitis    • Disease of thyroid gland    • Hypertension    • Leg pain    • Macular degeneration of both eyes     shots in both eyes monthly   • Seasonal allergies    • Wears glasses       Past Surgical History:   Procedure Laterality Date   • OTHER SURGICAL HISTORY      BTL   • OTHER SURGICAL HISTORY Bilateral     VAG LESION   • OTHER SURGICAL HISTORY      CORNEA X4, 2 IN LEFT AND 2 IN RIGHT   • OTHER SURGICAL HISTORY      Reported Corrective Surg to feet    • POSTERIOR REPAIR     • THYROIDECTOMY N/A 11/9/2016    Procedure: THYROIDECTOMY-TOTAL;  Surgeon: Nirmal JONES MD;  Location: [facility];  Service:    • TONSILLECTOMY AND ADENOIDECTOMY        Family History   Problem Relation Age of Onset   • Hypertension Father    • Prostate cancer Father    • Ovarian cancer Neg Hx    • Breast cancer Neg Hx      Social History     Socioeconomic History   •  Marital status:    • Number of children: 3   Tobacco Use   • Smoking status: Never Smoker   • Smokeless tobacco: Never Used   Substance and Sexual Activity   • Alcohol use: No   • Drug use: No   • Sexual activity: Yes     Partners: Male     Birth control/protection: Post-menopausal      Current Outpatient Medications on File Prior to Visit   Medication Sig Dispense Refill   • amLODIPine (NORVASC) 10 MG tablet Take 10 mg by mouth Daily.  1   • ARMOUR THYROID 60 MG tablet      • Calcium Carb-Cholecalciferol 600-500 MG-UNIT capsule Take 1 tablet by mouth Daily.     • clobetasol (OLUX) 0.05 % topical foam 1 application Daily.     • conjugated estrogens (PREMARIN) 0.625 MG/GM vaginal cream Insert  into the vagina 3 (Three) Times a Week. 30 g 1   • furosemide (LASIX) 40 MG tablet      • HYDROcodone-acetaminophen (NORCO) 5-325 MG per tablet      • ketoconazole (NIZORAL) 2 % shampoo Apply 1 application topically 1 (One) Time Per Week.     • levothyroxine (SYNTHROID, LEVOTHROID) 100 MCG tablet      • MEGARED OMEGA-3 KRILL OIL PO Take 1 tablet by mouth Daily.     • olmesartan (BENICAR) 40 MG tablet Take 40 mg by mouth Daily.  0   • ondansetron ODT (ZOFRAN-ODT) 4 MG disintegrating tablet      • polyethyl glycol-propyl glycol (SYSTANE) 0.4-0.3 % solution ophthalmic solution 1 drop Every 3 (Three) Hours As Needed (macro).     • Potassium Chloride (K+ POTASSIUM PO) Take 10 mg by mouth daily.     • pravastatin (PRAVACHOL) 20 MG tablet Take 20 mg by mouth daily.     • prednisoLONE acetate (PRED FORTE) 1 % ophthalmic suspension Administer 1 drop to both eyes Daily.     • TACLONEX 0.005-0.064 % external suspension 1 application Daily.       No current facility-administered medications on file prior to visit.      No Known Allergies       Review of Systems   Constitutional: Negative.    HENT: Negative.    Eyes: Negative.    Respiratory: Negative.    Cardiovascular: Negative.    Gastrointestinal: Negative.    Endocrine:  "Negative.    Genitourinary: Negative.    Musculoskeletal: Positive for arthralgias.   Skin: Negative.    Allergic/Immunologic: Negative.    Neurological: Negative.    Hematological: Negative.    Psychiatric/Behavioral: Negative.         I reviewed the patient's chief complaint, history of present illness, review of systems, past medical history, surgical history, family history, social history, medications and allergy list.        Objective      Physical Exam  /68   Pulse 73   Ht 160 cm (62.99\")   Wt 67.1 kg (148 lb)   LMP  (LMP Unknown)   BMI 26.22 kg/m²     Body mass index is 26.22 kg/m².    General  Mental Status - alert  General Appearance - cooperative, well groomed, not in acute distress  Orientation - Oriented X3  Build & Nutrition - well developed and well nourished  Posture - normal posture  Gait - normal gait       Ortho Exam  Left shoulder: Ecchymosis throughout the deltoid.  Intact sensation to light touch.  Neurovascular intact distally.  Pain with passive range of motion.  No gross deformity.    Imaging/Studies  Imaging Results (Last 24 Hours)     ** No results found for the last 24 hours. **        We reviewed imaging studies taken in the office today.  AP and scapular Y view revealing surgical neck and greater tuberosity fracture of the left proximal humerus.  There is anterior translation of the shaft onto the head fragment but there does appear to be cortical contact.    Assessment    Assessment:  1. Closed fracture of proximal end of left humerus, unspecified fracture morphology, initial encounter        Plan:  1. Continue over-the-counter medication as needed for discomfort  2. Left proximal humerus fracture--nondominant arm.  We have discussed the fracture findings with the patient and with Dr. Lund.  We have agreed to proceed with a CT scan to further evaluate the fracture.  We will get her some hydrocodone at her request today.  See her back Thursday to review the findings and see " if this merits further intervention or if this can be treated nonoperatively.        Santosh Xie MD  11/02/21  08:47 EDT    Dragon disclaimer:  Much of this encounter note is an electronic transcription/translation of spoken language to printed text. The electronic translation of spoken language may permit erroneous, or at times, nonsensical words or phrases to be inadvertently transcribed; Although I have reviewed the note for such errors, some may still exist.

## 2021-11-04 ENCOUNTER — OFFICE VISIT (OUTPATIENT)
Dept: ORTHOPEDIC SURGERY | Facility: CLINIC | Age: 81
End: 2021-11-04

## 2021-11-04 VITALS
HEIGHT: 63 IN | WEIGHT: 147.93 LBS | HEART RATE: 72 BPM | SYSTOLIC BLOOD PRESSURE: 159 MMHG | DIASTOLIC BLOOD PRESSURE: 56 MMHG | BODY MASS INDEX: 26.21 KG/M2

## 2021-11-04 DIAGNOSIS — S42.202D CLOSED FRACTURE OF PROXIMAL END OF LEFT HUMERUS WITH ROUTINE HEALING, UNSPECIFIED FRACTURE MORPHOLOGY, SUBSEQUENT ENCOUNTER: Primary | ICD-10-CM

## 2021-11-04 PROCEDURE — 99214 OFFICE O/P EST MOD 30 MIN: CPT | Performed by: ORTHOPAEDIC SURGERY

## 2021-11-04 NOTE — PROGRESS NOTES
"    [facility] [place] [facility] Note        Subjective     CC: Follow-up of the Left Shoulder (2 day f/u ,Post CT scan on 11/02/21, Closed fracture of proximal end of left humerus, )      HPI    Kaylan Craig is a 81 y.o. female.  Patient returns for follow-up after the CT scan of her left proximal humerus fracture.  Patient is having some difficulties with her sling.    Overall, patient's symptoms are as above.    ROS:    Constiutional:Pt denies fever, chills, nausea, or vomiting.  MSK:as above        Objective      Past Medical History  Past Medical History:   Diagnosis Date   • Anemia     Blood Loss requiring blood transfusions during her hospitalizatoin at .    • Arthritis    • ASCUS favor benign    • Atrophic vaginitis    • Disease of thyroid gland    • Hypertension    • Leg pain    • Macular degeneration of both eyes     shots in both eyes monthly   • Seasonal allergies    • Wears glasses          Physical Exam  /56   Pulse 72   Ht 160 cm (62.99\")   Wt 67.1 kg (147 lb 14.9 oz)   LMP  (LMP Unknown)   BMI 26.21 kg/m²     Body mass index is 26.21 kg/m².    Patient is well nourished and well developed.        Ortho Exam  Patient has some swelling in her elbow.  She is able to move her hand without difficulty.  Palpable radial pulse.  Intact neurovascular exam in the hand.    Imaging/Labs/EMG Reviewed:  Imaging Results (Last 24 Hours)     ** No results found for the last 24 hours. **        CT Upper Extremity Left Without Contrast  Narrative: EXAMINATION: CT UPPER EXTREMITY LEFT WO CONTRAST- 11/02/2021     INDICATION: Shoulder trauma, fracture of humerus or scapula;  S42.202A-Unspecified fracture of upper end of left humerus, initial  encounter for closed fracture      TECHNIQUE: CT upper extremity left without intravenous contrast  administration     The radiation dose reduction device was turned on for each scan per the  ALARA (As Low as Reasonably Achievable) protocol.     COMPARISON: Shoulder " x-ray earlier same day     FINDINGS: Mild to moderate DJD of the AC joint without widening. Scapula  unremarkable with osseous glenoid unremarkable. Acute fracture of the  proximal humerus demonstrating posterolateral displacement and  fragmentation multipart comminuted fracture through the surgical neck  with lateral fragmentation and surrounding edema. There is  posterolateral subluxation of the humeral head in relationship to the  osseous glenoid. Left ribs unremarkable. No abnormality in the left  hemithorax.     Impression: Acute fracture of the proximal humerus demonstrating  posterolateral displacement and fragmentation multipart comminuted  fracture through the surgical neck with lateral fragmentation and  surrounding edema. There is posterolateral subluxation of the humeral  head in relationship to the osseous glenoid.     D: 11/02/2021  E: 11/02/2021        XR Shoulder 2+ View Left  Left Shoulder X-Ray    Indication: Pain    Study:  AP and scapular Y views    Comparison: None    Findings:  There is a fracture of the proximal humerus at the greater tuberosity and   surgical neck.  No bony lesions are visualized.  Normal soft tissue appearance  There appears to be mild anterior displacement of the shaft onto the head   fragment on the scapular Y view.    Impression:    Proximal humerus fracture of the left shoulder    We have reviewed images and report of the CT scan of the patient's left proximal humerus.  Our interpretation is that the shaft is still in contact with the head fragment.  The greater tuberosity still attached for the most part.  Assessment    Assessment:  1. Closed fracture of proximal end of left humerus with routine healing, unspecified fracture morphology, subsequent encounter        Plan:  1. Recommend over the counter anti-inflammatories for pain and/or swelling  2.  Plan will be for nonoperative treatment for now.  We will adjust and change her sling out.  I will see her back in a week  with a Grashey and scapular Y view.        EVE Bustos(R)  11/04/21  09:47 EDT      Dragon disclaimer:  Much of this encounter note is an electronic transcription/translation of spoken language to printed text. The electronic translation of spoken language may permit erroneous, or at times, nonsensical words or phrases to be inadvertently transcribed; Although I have reviewed the note for such errors, some may still exist.

## 2021-11-11 ENCOUNTER — OFFICE VISIT (OUTPATIENT)
Dept: ORTHOPEDIC SURGERY | Facility: CLINIC | Age: 81
End: 2021-11-11

## 2021-11-11 VITALS — WEIGHT: 147.93 LBS | HEART RATE: 82 BPM | OXYGEN SATURATION: 99 % | BODY MASS INDEX: 26.21 KG/M2 | HEIGHT: 63 IN

## 2021-11-11 DIAGNOSIS — S42.202D CLOSED FRACTURE OF PROXIMAL END OF LEFT HUMERUS WITH ROUTINE HEALING, UNSPECIFIED FRACTURE MORPHOLOGY, SUBSEQUENT ENCOUNTER: Primary | ICD-10-CM

## 2021-11-11 PROCEDURE — 99213 OFFICE O/P EST LOW 20 MIN: CPT | Performed by: ORTHOPAEDIC SURGERY
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
## 2021-11-18 ENCOUNTER — OFFICE VISIT (OUTPATIENT)
Dept: ORTHOPEDIC SURGERY | Facility: CLINIC | Age: 81
End: 2021-11-18

## 2021-11-18 VITALS
BODY MASS INDEX: 26.21 KG/M2 | WEIGHT: 147.93 LBS | HEIGHT: 63 IN | DIASTOLIC BLOOD PRESSURE: 70 MMHG | SYSTOLIC BLOOD PRESSURE: 135 MMHG

## 2021-11-18 DIAGNOSIS — S42.202D CLOSED FRACTURE OF PROXIMAL END OF LEFT HUMERUS WITH ROUTINE HEALING, UNSPECIFIED FRACTURE MORPHOLOGY, SUBSEQUENT ENCOUNTER: Primary | ICD-10-CM

## 2021-11-18 PROCEDURE — 99213 OFFICE O/P EST LOW 20 MIN: CPT | Performed by: ORTHOPAEDIC SURGERY

## 2021-11-18 NOTE — PROGRESS NOTES
"    [facility] [place] [facility]        Subjective     Follow-up (1 week recheck -  Closed fracture of proximal end of left humerus)       FLORENCIO Craig is a 81 y.o. female. They return for follow-up of their left proximal humerus fracture.              Objective      Physical Exam  /70   Ht 160 cm (62.99\")   Wt 67.1 kg (147 lb 14.9 oz)   LMP  (LMP Unknown)   BMI 26.21 kg/m²     Body mass index is 26.21 kg/m².        Ortho Exam of left upper extremity     Swelling: Improved overall.  Ecchymosis is at the wrist and hand now.   Tenderness: None to palpation   ROM: Diminished but within normal limits        Imaging/Studies  Imaging Results (Last 24 Hours)     Procedure Component Value Units Date/Time    XR Shoulder 2+ View Left [[id number]] Resulted: 11/18/21 0826     Updated: 11/18/21 0827    Narrative:      Left Shoulder X-Ray    Indication: Pain    Study:  AP and scapular Y views    Comparison: Left shoulder 11/11/2021    Findings:  There is a fracture of the left proximal humerus at the surgical neck and   seemingly at the greater tuberosity.  There appears to be a small amount of new bone visible  No bony lesions are visualized.  Normal soft tissue appearance  Alignment is grossly acceptable      Impression:    Proximal humerus fracture of the shoulder with interval but incomplete   signs of healing          Assessment    Assessment:  1. Closed fracture of proximal end of left humerus with routine healing, unspecified fracture morphology, subsequent encounter        Plan:  1. Proximal humerus fracture--we shared the films with Dr. Lund overall and he agrees that nonoperative treatment should be continued.  There is early bone visible.  When to start some group of 5 exercises minus the table slides.  I will see patient back in 2 weeks with an x-ray and if everything looks fine we will start weaning from the sling and start formal PT for gentle stretching.  We have talked about reasonable " expectations after this fracture.  Patient is also having little bit of burning in the upper extremity which we have told her should improve with time.          Santosh Xie MD  11/18/21  08:35 EST      Dictated Utilizing Dragon Dictation.

## 2021-12-02 ENCOUNTER — OFFICE VISIT (OUTPATIENT)
Dept: ORTHOPEDIC SURGERY | Facility: CLINIC | Age: 81
End: 2021-12-02

## 2021-12-02 VITALS
HEIGHT: 63 IN | WEIGHT: 147.93 LBS | SYSTOLIC BLOOD PRESSURE: 144 MMHG | BODY MASS INDEX: 26.21 KG/M2 | DIASTOLIC BLOOD PRESSURE: 78 MMHG

## 2021-12-02 DIAGNOSIS — S42.202D CLOSED FRACTURE OF PROXIMAL END OF LEFT HUMERUS WITH ROUTINE HEALING, UNSPECIFIED FRACTURE MORPHOLOGY, SUBSEQUENT ENCOUNTER: Primary | ICD-10-CM

## 2021-12-02 PROCEDURE — 99213 OFFICE O/P EST LOW 20 MIN: CPT | Performed by: ORTHOPAEDIC SURGERY
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
## 2021-12-10 ENCOUNTER — TRANSCRIBE ORDERS (OUTPATIENT)
Dept: ADMINISTRATIVE | Facility: HOSPITAL | Age: 81
End: 2021-12-10

## 2021-12-10 DIAGNOSIS — Z12.31 VISIT FOR SCREENING MAMMOGRAM: Primary | ICD-10-CM

## 2022-01-06 ENCOUNTER — OFFICE VISIT (OUTPATIENT)
Dept: ORTHOPEDIC SURGERY | Facility: CLINIC | Age: 82
End: 2022-01-06

## 2022-01-06 VITALS
BODY MASS INDEX: 26.21 KG/M2 | DIASTOLIC BLOOD PRESSURE: 77 MMHG | SYSTOLIC BLOOD PRESSURE: 140 MMHG | WEIGHT: 147.93 LBS | HEIGHT: 63 IN

## 2022-01-06 DIAGNOSIS — S42.202D CLOSED FRACTURE OF PROXIMAL END OF LEFT HUMERUS WITH ROUTINE HEALING, UNSPECIFIED FRACTURE MORPHOLOGY, SUBSEQUENT ENCOUNTER: Primary | ICD-10-CM

## 2022-01-06 PROCEDURE — 99213 OFFICE O/P EST LOW 20 MIN: CPT | Performed by: ORTHOPAEDIC SURGERY
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
## 2022-02-17 ENCOUNTER — HOSPITAL ENCOUNTER (OUTPATIENT)
Dept: MAMMOGRAPHY | Facility: HOSPITAL | Age: 82
Discharge: HOME | End: 2022-02-17
Admitting: NURSE PRACTITIONER

## 2022-02-17 DIAGNOSIS — Z12.31 VISIT FOR SCREENING MAMMOGRAM: ICD-10-CM

## 2022-02-17 PROCEDURE — 77067 SCR MAMMO BI INCL CAD: CPT

## 2022-02-17 PROCEDURE — 77067 SCR MAMMO BI INCL CAD: CPT | Performed by: RADIOLOGY

## 2022-02-17 PROCEDURE — 77063 BREAST TOMOSYNTHESIS BI: CPT | Performed by: RADIOLOGY

## 2022-02-17 PROCEDURE — 77063 BREAST TOMOSYNTHESIS BI: CPT

## 2022-04-12 ENCOUNTER — OFFICE VISIT (OUTPATIENT)
Dept: ORTHOPEDIC SURGERY | Facility: CLINIC | Age: 82
End: 2022-04-12

## 2022-04-12 VITALS
HEIGHT: 63 IN | SYSTOLIC BLOOD PRESSURE: 128 MMHG | DIASTOLIC BLOOD PRESSURE: 70 MMHG | WEIGHT: 141.8 LBS | BODY MASS INDEX: 25.12 KG/M2

## 2022-04-12 DIAGNOSIS — M25.551 RIGHT HIP PAIN: ICD-10-CM

## 2022-04-12 DIAGNOSIS — M17.11 PRIMARY OSTEOARTHRITIS OF RIGHT KNEE: Primary | ICD-10-CM

## 2022-04-12 DIAGNOSIS — M70.61 TROCHANTERIC BURSITIS OF RIGHT HIP: ICD-10-CM

## 2022-04-12 PROCEDURE — 20610 DRAIN/INJ JOINT/BURSA W/O US: CPT | Performed by: ORTHOPAEDIC SURGERY

## 2022-04-12 PROCEDURE — 99214 OFFICE O/P EST MOD 30 MIN: CPT | Performed by: ORTHOPAEDIC SURGERY

## 2022-04-12 RX ORDER — TRIAMCINOLONE ACETONIDE 40 MG/ML
40 INJECTION, SUSPENSION INTRA-ARTICULAR; INTRAMUSCULAR
Status: COMPLETED | OUTPATIENT
Start: 2022-04-12 | End: 2022-04-12

## 2022-04-12 RX ORDER — LIDOCAINE HYDROCHLORIDE 10 MG/ML
3 INJECTION, SOLUTION EPIDURAL; INFILTRATION; INTRACAUDAL; PERINEURAL
Status: COMPLETED | OUTPATIENT
Start: 2022-04-12 | End: 2022-04-12

## 2022-04-12 RX ORDER — VANCOMYCIN HYDROCHLORIDE 500 MG/10ML
1 INJECTION, POWDER, LYOPHILIZED, FOR SOLUTION INTRAVENOUS
COMMUNITY
Start: 2022-02-13 | End: 2022-12-13

## 2022-04-12 RX ORDER — CARBOXYMETHYLCELLULOSE SODIUM 5 MG/ML
1 SOLUTION/ DROPS OPHTHALMIC
COMMUNITY

## 2022-04-12 RX ORDER — TRIPROLIDINE/PSEUDOEPHEDRINE 2.5MG-60MG
TABLET ORAL
COMMUNITY
Start: 2022-03-29

## 2022-04-12 RX ADMIN — LIDOCAINE HYDROCHLORIDE 3 ML: 10 INJECTION, SOLUTION EPIDURAL; INFILTRATION; INTRACAUDAL; PERINEURAL at 09:21

## 2022-04-12 RX ADMIN — TRIAMCINOLONE ACETONIDE 40 MG: 40 INJECTION, SUSPENSION INTRA-ARTICULAR; INTRAMUSCULAR at 09:21
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
## 2022-04-12 NOTE — PROGRESS NOTES
Procedure   - Large Joint Arthrocentesis: R greater trochanteric bursa on 4/12/2022 9:21 AM  Indications: pain  Details: 22 G needle, anterolateral approach  Medications: 3 mL lidocaine PF 1% 1 %; 40 mg triamcinolone acetonide 40 MG/ML  Outcome: tolerated well, no immediate complications  Procedure, treatment alternatives, risks and benefits explained, specific risks discussed. Consent was given by the patient. Immediately prior to procedure a time out was called to verify the correct patient, procedure, equipment, support staff and site/side marked as required. Patient was prepped and draped in the usual sterile fashion.

## 2022-04-19 ENCOUNTER — TELEPHONE (OUTPATIENT)
Dept: ORTHOPEDIC SURGERY | Facility: CLINIC | Age: 82
End: 2022-04-19

## 2022-04-19 NOTE — TELEPHONE ENCOUNTER
Provider: DR. SARMAD ASH  Caller:  KATELYN ESQUEDA  Relationship to Patient: SELF  Pharmacy:   Phone Number: [phone]  Reason for Call: PATIENT SAW DR. ASH ON 4/12/22 AND SAYS SHE MENTIONED HAVING SOME LEG PAIN. PATIENT WENT TO URGENT CARE IN [place] ON 4/23/22 AND THEY DID A CAT SCAN OF HER BACK AND SAID SHE HAS SPINAL STENOSIS AND A BULGING DISC. PATIENT IS ASKING IF DR. ASH WOULD RECOMMEND A DOCTOR FOR PATIENT TO SEE FOR THIS DIAGNOSIS.

## 2022-04-20 NOTE — TELEPHONE ENCOUNTER
I called patient and told her who he recommended and she will call her PCP to have them put in a referral.  Lupis

## 2022-07-12 ENCOUNTER — OFFICE VISIT (OUTPATIENT)
Dept: ORTHOPEDIC SURGERY | Facility: CLINIC | Age: 82
End: 2022-07-12

## 2022-07-12 VITALS
DIASTOLIC BLOOD PRESSURE: 60 MMHG | BODY MASS INDEX: 23.71 KG/M2 | HEIGHT: 63 IN | WEIGHT: 133.8 LBS | SYSTOLIC BLOOD PRESSURE: 110 MMHG

## 2022-07-12 DIAGNOSIS — M70.61 TROCHANTERIC BURSITIS OF RIGHT HIP: ICD-10-CM

## 2022-07-12 DIAGNOSIS — M17.11 PRIMARY OSTEOARTHRITIS OF RIGHT KNEE: Primary | ICD-10-CM

## 2022-07-12 PROCEDURE — 99213 OFFICE O/P EST LOW 20 MIN: CPT | Performed by: ORTHOPAEDIC SURGERY

## 2022-07-12 RX ORDER — HYDROCODONE BITARTRATE AND ACETAMINOPHEN 7.5; 325 MG/1; MG/1
1 TABLET ORAL EVERY 6 HOURS PRN
COMMUNITY
Start: 2022-07-11 | End: 2022-07-27 | Stop reason: HOSPADM

## 2022-07-12 RX ORDER — TRAMADOL HYDROCHLORIDE 50 MG/1
TABLET ORAL
Status: ON HOLD | COMMUNITY
Start: 2022-04-19 | End: 2022-07-25
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
## 2022-07-22 ENCOUNTER — APPOINTMENT (OUTPATIENT)
Dept: PREADMISSION TESTING | Facility: HOSPITAL | Age: 82
End: 2022-07-22

## 2022-07-22 ENCOUNTER — PRE-ADMISSION TESTING (OUTPATIENT)
Dept: PREADMISSION TESTING | Facility: HOSPITAL | Age: 82
End: 2022-07-22

## 2022-07-22 VITALS — BODY MASS INDEX: 24.04 KG/M2 | WEIGHT: 135.69 LBS | HEIGHT: 63 IN

## 2022-07-22 LAB
ABO GROUP BLD: NORMAL
BLD GP AB SCN SERPL QL: NEGATIVE
DEPRECATED RDW RBC AUTO: 43.3 FL (ref 37–54)
ERYTHROCYTE [DISTWIDTH] IN BLOOD BY AUTOMATED COUNT: 13.1 % (ref 12.3–15.4)
HBA1C MFR BLD: 5.4 % (ref 4.8–5.6)
HCT VFR BLD AUTO: 43.4 % (ref 34–46.6)
HGB BLD-MCNC: 14 G/DL (ref 12–15.9)
MCH RBC QN AUTO: 29.3 PG (ref 26.6–33)
MCHC RBC AUTO-ENTMCNC: 32.3 G/DL (ref 31.5–35.7)
MCV RBC AUTO: 90.8 FL (ref 79–97)
PLATELET # BLD AUTO: 160 10*3/MM3 (ref 140–450)
PMV BLD AUTO: 9.8 FL (ref 6–12)
POTASSIUM SERPL-SCNC: 4.1 MMOL/L (ref 3.5–5.2)
QT INTERVAL: 404 MS
QTC INTERVAL: 423 MS
RBC # BLD AUTO: 4.78 10*6/MM3 (ref 3.77–5.28)
RH BLD: POSITIVE
SARS-COV-2 RNA PNL SPEC NAA+PROBE: NOT DETECTED
T&S EXPIRATION DATE: NORMAL
WBC NRBC COR # BLD: 5.51 10*3/MM3 (ref 3.4–10.8)

## 2022-07-22 PROCEDURE — 83036 HEMOGLOBIN GLYCOSYLATED A1C: CPT

## 2022-07-22 PROCEDURE — 93010 ELECTROCARDIOGRAM REPORT: CPT | Performed by: INTERNAL MEDICINE

## 2022-07-22 PROCEDURE — 84132 ASSAY OF SERUM POTASSIUM: CPT

## 2022-07-22 PROCEDURE — 86900 BLOOD TYPING SEROLOGIC ABO: CPT

## 2022-07-22 PROCEDURE — 85027 COMPLETE CBC AUTOMATED: CPT

## 2022-07-22 PROCEDURE — 36415 COLL VENOUS BLD VENIPUNCTURE: CPT

## 2022-07-22 PROCEDURE — 93005 ELECTROCARDIOGRAM TRACING: CPT

## 2022-07-22 PROCEDURE — C9803 HOPD COVID-19 SPEC COLLECT: HCPCS

## 2022-07-22 PROCEDURE — 86901 BLOOD TYPING SEROLOGIC RH(D): CPT

## 2022-07-22 PROCEDURE — U0004 COV-19 TEST NON-CDC HGH THRU: HCPCS

## 2022-07-22 PROCEDURE — 86850 RBC ANTIBODY SCREEN: CPT

## 2022-07-22 RX ORDER — AMOXICILLIN 250 MG
2 CAPSULE ORAL 2 TIMES DAILY
COMMUNITY

## 2022-07-22 RX ORDER — ACETAMINOPHEN 500 MG
1000 TABLET ORAL EVERY 6 HOURS PRN
COMMUNITY

## 2022-07-22 RX ORDER — LORATADINE 10 MG/1
10 CAPSULE, LIQUID FILLED ORAL DAILY
COMMUNITY

## 2022-07-25 ENCOUNTER — APPOINTMENT (OUTPATIENT)
Dept: GENERAL RADIOLOGY | Facility: HOSPITAL | Age: 82
End: 2022-07-25

## 2022-07-25 ENCOUNTER — HOSPITAL ENCOUNTER (INPATIENT)
Facility: HOSPITAL | Age: 82
LOS: 2 days | Discharge: HOME | End: 2022-07-27
Attending: ORTHOPAEDIC SURGERY | Admitting: ORTHOPAEDIC SURGERY

## 2022-07-25 ENCOUNTER — ANESTHESIA EVENT (OUTPATIENT)
Dept: PERIOP | Facility: HOSPITAL | Age: 82
End: 2022-07-25

## 2022-07-25 ENCOUNTER — ANESTHESIA (OUTPATIENT)
Dept: PERIOP | Facility: HOSPITAL | Age: 82
End: 2022-07-25

## 2022-07-25 DIAGNOSIS — E04.1 THYROID NODULE: ICD-10-CM

## 2022-07-25 DIAGNOSIS — Z01.419 WELL FEMALE EXAM WITH ROUTINE GYNECOLOGICAL EXAM: ICD-10-CM

## 2022-07-25 DIAGNOSIS — Z98.1 S/P LUMBAR SPINAL FUSION: Primary | ICD-10-CM

## 2022-07-25 DIAGNOSIS — M43.16 SPONDYLOLISTHESIS AT L4-L5 LEVEL: ICD-10-CM

## 2022-07-25 DIAGNOSIS — M93.90 OSTEOCHONDRITIS: ICD-10-CM

## 2022-07-25 DIAGNOSIS — D62 ACUTE BLOOD LOSS ANEMIA: ICD-10-CM

## 2022-07-25 DIAGNOSIS — R87.610 PAPANICOLAOU SMEAR OF CERVIX WITH ATYPICAL SQUAMOUS CELLS OF UNDETERMINED SIGNIFICANCE (ASC-US): ICD-10-CM

## 2022-07-25 PROBLEM — E78.5 HYPERLIPIDEMIA: Status: ACTIVE | Noted: 2022-07-25

## 2022-07-25 PROBLEM — E03.9 HYPOTHYROIDISM: Status: ACTIVE | Noted: 2022-07-25

## 2022-07-25 PROBLEM — I10 HTN (HYPERTENSION): Status: ACTIVE | Noted: 2022-07-25

## 2022-07-25 LAB
ABO GROUP BLD: NORMAL
RH BLD: POSITIVE

## 2022-07-25 PROCEDURE — 22634 ARTHRD CMBN 1NTRSPC EA ADDL: CPT | Performed by: PHYSICIAN ASSISTANT

## 2022-07-25 PROCEDURE — C1713 ANCHOR/SCREW BN/BN,TIS/BN: HCPCS | Performed by: ORTHOPAEDIC SURGERY

## 2022-07-25 PROCEDURE — 25010000002 DEXAMETHASONE PER 1 MG: Performed by: ANESTHESIOLOGY

## 2022-07-25 PROCEDURE — 25010000002 HYDROMORPHONE PER 4 MG: Performed by: ANESTHESIOLOGY

## 2022-07-25 PROCEDURE — 76000 FLUOROSCOPY <1 HR PHYS/QHP: CPT

## 2022-07-25 PROCEDURE — 25010000002 ONDANSETRON PER 1 MG: Performed by: ANESTHESIOLOGY

## 2022-07-25 PROCEDURE — 25010000002 SODIUM CHLORIDE 0.9 % WITH KCL 20 MEQ 20-0.9 MEQ/L-% SOLUTION: Performed by: ORTHOPAEDIC SURGERY

## 2022-07-25 PROCEDURE — 25010000002 FENTANYL CITRATE (PF) 50 MCG/ML SOLUTION: Performed by: ANESTHESIOLOGY

## 2022-07-25 PROCEDURE — 97116 GAIT TRAINING THERAPY: CPT

## 2022-07-25 PROCEDURE — 0SG10AJ FUSION OF 2 OR MORE LUMBAR VERTEBRAL JOINTS WITH INTERBODY FUSION DEVICE, POSTERIOR APPROACH, ANTERIOR COLUMN, OPEN APPROACH: ICD-10-PCS | Performed by: ORTHOPAEDIC SURGERY

## 2022-07-25 PROCEDURE — 22842 INSERT SPINE FIXATION DEVICE: CPT | Performed by: PHYSICIAN ASSISTANT

## 2022-07-25 PROCEDURE — 25010000002 PHENYLEPHRINE 10 MG/ML SOLUTION: Performed by: ANESTHESIOLOGY

## 2022-07-25 PROCEDURE — L0120 CERV FLEX N/ADJ FOAM PRE OTS: HCPCS | Performed by: ORTHOPAEDIC SURGERY

## 2022-07-25 PROCEDURE — 0SG10K1 FUSION OF 2 OR MORE LUMBAR VERTEBRAL JOINTS WITH NONAUTOLOGOUS TISSUE SUBSTITUTE, POSTERIOR APPROACH, POSTERIOR COLUMN, OPEN APPROACH: ICD-10-PCS | Performed by: ORTHOPAEDIC SURGERY

## 2022-07-25 PROCEDURE — 22633 ARTHRD CMBN 1NTRSPC LUMBAR: CPT | Performed by: PHYSICIAN ASSISTANT

## 2022-07-25 PROCEDURE — 25010000002 PROPOFOL 10 MG/ML EMULSION: Performed by: ANESTHESIOLOGY

## 2022-07-25 PROCEDURE — 22853 INSJ BIOMECHANICAL DEVICE: CPT | Performed by: PHYSICIAN ASSISTANT

## 2022-07-25 PROCEDURE — 72020 X-RAY EXAM OF SPINE 1 VIEW: CPT

## 2022-07-25 PROCEDURE — 63052 LAM FACETC/FRMT ARTHRD LUM 1: CPT | Performed by: PHYSICIAN ASSISTANT

## 2022-07-25 PROCEDURE — 86901 BLOOD TYPING SEROLOGIC RH(D): CPT

## 2022-07-25 PROCEDURE — 86900 BLOOD TYPING SEROLOGIC ABO: CPT

## 2022-07-25 PROCEDURE — 97161 PT EVAL LOW COMPLEX 20 MIN: CPT

## 2022-07-25 PROCEDURE — 25010000002 CEFAZOLIN PER 500 MG: Performed by: ORTHOPAEDIC SURGERY

## 2022-07-25 PROCEDURE — 25010000002 FENTANYL CITRATE (PF) 50 MCG/ML SOLUTION

## 2022-07-25 DEVICE — SCRW INNR EXPEDIUM: Type: IMPLANTABLE DEVICE | Site: SPINE LUMBAR | Status: FUNCTIONAL

## 2022-07-25 DEVICE — ALLOGRFT BONE VIVIGEN CELLUAR MATRX FORMABLE 5CC: Type: IMPLANTABLE DEVICE | Site: SPINE LUMBAR | Status: FUNCTIONAL

## 2022-07-25 DEVICE — SCRW EXPEDIUM PA TI 6X45MM: Type: IMPLANTABLE DEVICE | Site: SPINE LUMBAR | Status: FUNCTIONAL

## 2022-07-25 DEVICE — SCRW EXPEDIUM PA TI 6X50MM: Type: IMPLANTABLE DEVICE | Site: SPINE LUMBAR | Status: FUNCTIONAL

## 2022-07-25 DEVICE — ROD PRELRD SPINE EXPEDIUM W/LINE 65MM: Type: IMPLANTABLE DEVICE | Site: SPINE LUMBAR | Status: FUNCTIONAL

## 2022-07-25 DEVICE — SPACR TPAL PEEK 10X9X28MM: Type: IMPLANTABLE DEVICE | Site: SPINE LUMBAR | Status: FUNCTIONAL

## 2022-07-25 DEVICE — HEMOST ABS SURGIFOAM SZ100 8X12 10MM: Type: IMPLANTABLE DEVICE | Site: SPINE LUMBAR | Status: FUNCTIONAL

## 2022-07-25 RX ORDER — SODIUM CHLORIDE AND POTASSIUM CHLORIDE 150; 900 MG/100ML; MG/100ML
100 INJECTION, SOLUTION INTRAVENOUS CONTINUOUS
Status: DISCONTINUED | OUTPATIENT
Start: 2022-07-25 | End: 2022-07-27 | Stop reason: HOSPADM

## 2022-07-25 RX ORDER — OXYCODONE HCL 10 MG/1
10 TABLET, FILM COATED, EXTENDED RELEASE ORAL ONCE
Status: COMPLETED | OUTPATIENT
Start: 2022-07-25 | End: 2022-07-25

## 2022-07-25 RX ORDER — FUROSEMIDE 40 MG/1
40 TABLET ORAL DAILY
Status: DISCONTINUED | OUTPATIENT
Start: 2022-07-26 | End: 2022-07-27 | Stop reason: HOSPADM

## 2022-07-25 RX ORDER — MIDAZOLAM HYDROCHLORIDE 1 MG/ML
0.5 INJECTION INTRAMUSCULAR; INTRAVENOUS
Status: DISCONTINUED | OUTPATIENT
Start: 2022-07-25 | End: 2022-07-25 | Stop reason: HOSPADM

## 2022-07-25 RX ORDER — SODIUM CHLORIDE 0.9 % (FLUSH) 0.9 %
10 SYRINGE (ML) INJECTION EVERY 12 HOURS SCHEDULED
Status: DISCONTINUED | OUTPATIENT
Start: 2022-07-25 | End: 2022-07-25 | Stop reason: HOSPADM

## 2022-07-25 RX ORDER — FAMOTIDINE 10 MG/ML
20 INJECTION, SOLUTION INTRAVENOUS EVERY 12 HOURS SCHEDULED
Status: DISCONTINUED | OUTPATIENT
Start: 2022-07-25 | End: 2022-07-27 | Stop reason: HOSPADM

## 2022-07-25 RX ORDER — FAMOTIDINE 20 MG/1
20 TABLET, FILM COATED ORAL EVERY 12 HOURS SCHEDULED
Status: DISCONTINUED | OUTPATIENT
Start: 2022-07-25 | End: 2022-07-27 | Stop reason: HOSPADM

## 2022-07-25 RX ORDER — NALOXONE HCL 0.4 MG/ML
0.4 VIAL (ML) INJECTION AS NEEDED
Status: DISCONTINUED | OUTPATIENT
Start: 2022-07-25 | End: 2022-07-25 | Stop reason: HOSPADM

## 2022-07-25 RX ORDER — SODIUM CHLORIDE 0.9 % (FLUSH) 0.9 %
10 SYRINGE (ML) INJECTION AS NEEDED
Status: DISCONTINUED | OUTPATIENT
Start: 2022-07-25 | End: 2022-07-25 | Stop reason: HOSPADM

## 2022-07-25 RX ORDER — HYDROCODONE BITARTRATE AND ACETAMINOPHEN 10; 325 MG/1; MG/1
1 TABLET ORAL EVERY 4 HOURS PRN
Status: DISCONTINUED | OUTPATIENT
Start: 2022-07-25 | End: 2022-07-27 | Stop reason: HOSPADM

## 2022-07-25 RX ORDER — LABETALOL HYDROCHLORIDE 5 MG/ML
10 INJECTION, SOLUTION INTRAVENOUS EVERY 4 HOURS PRN
Status: DISCONTINUED | OUTPATIENT
Start: 2022-07-25 | End: 2022-07-27 | Stop reason: HOSPADM

## 2022-07-25 RX ORDER — LABETALOL HYDROCHLORIDE 5 MG/ML
5 INJECTION, SOLUTION INTRAVENOUS
Status: DISCONTINUED | OUTPATIENT
Start: 2022-07-25 | End: 2022-07-25 | Stop reason: HOSPADM

## 2022-07-25 RX ORDER — DROPERIDOL 2.5 MG/ML
0.62 INJECTION, SOLUTION INTRAMUSCULAR; INTRAVENOUS ONCE AS NEEDED
Status: DISCONTINUED | OUTPATIENT
Start: 2022-07-25 | End: 2022-07-25 | Stop reason: HOSPADM

## 2022-07-25 RX ORDER — EPHEDRINE SULFATE 50 MG/ML
5 INJECTION, SOLUTION INTRAVENOUS ONCE AS NEEDED
Status: DISCONTINUED | OUTPATIENT
Start: 2022-07-25 | End: 2022-07-25 | Stop reason: HOSPADM

## 2022-07-25 RX ORDER — EPHEDRINE SULFATE 50 MG/ML
INJECTION, SOLUTION INTRAVENOUS AS NEEDED
Status: DISCONTINUED | OUTPATIENT
Start: 2022-07-25 | End: 2022-07-25 | Stop reason: SURG

## 2022-07-25 RX ORDER — SODIUM CHLORIDE, SODIUM LACTATE, POTASSIUM CHLORIDE, CALCIUM CHLORIDE 600; 310; 30; 20 MG/100ML; MG/100ML; MG/100ML; MG/100ML
9 INJECTION, SOLUTION INTRAVENOUS CONTINUOUS
Status: DISCONTINUED | OUTPATIENT
Start: 2022-07-25 | End: 2022-07-25

## 2022-07-25 RX ORDER — SODIUM CHLORIDE 0.9 % (FLUSH) 0.9 %
3 SYRINGE (ML) INJECTION EVERY 12 HOURS SCHEDULED
Status: DISCONTINUED | OUTPATIENT
Start: 2022-07-25 | End: 2022-07-27 | Stop reason: HOSPADM

## 2022-07-25 RX ORDER — FENTANYL CITRATE 50 UG/ML
INJECTION, SOLUTION INTRAMUSCULAR; INTRAVENOUS
Status: COMPLETED
Start: 2022-07-25 | End: 2022-07-25

## 2022-07-25 RX ORDER — NALOXONE HCL 0.4 MG/ML
0.4 VIAL (ML) INJECTION
Status: DISCONTINUED | OUTPATIENT
Start: 2022-07-25 | End: 2022-07-27 | Stop reason: HOSPADM

## 2022-07-25 RX ORDER — SODIUM CHLORIDE 0.9 % (FLUSH) 0.9 %
3 SYRINGE (ML) INJECTION EVERY 12 HOURS SCHEDULED
Status: DISCONTINUED | OUTPATIENT
Start: 2022-07-25 | End: 2022-07-25 | Stop reason: HOSPADM

## 2022-07-25 RX ORDER — HYDRALAZINE HYDROCHLORIDE 20 MG/ML
5 INJECTION INTRAMUSCULAR; INTRAVENOUS
Status: DISCONTINUED | OUTPATIENT
Start: 2022-07-25 | End: 2022-07-25 | Stop reason: HOSPADM

## 2022-07-25 RX ORDER — MORPHINE SULFATE 2 MG/ML
1 INJECTION, SOLUTION INTRAMUSCULAR; INTRAVENOUS EVERY 4 HOURS PRN
Status: DISCONTINUED | OUTPATIENT
Start: 2022-07-25 | End: 2022-07-27 | Stop reason: HOSPADM

## 2022-07-25 RX ORDER — ESMOLOL HYDROCHLORIDE 10 MG/ML
INJECTION INTRAVENOUS AS NEEDED
Status: DISCONTINUED | OUTPATIENT
Start: 2022-07-25 | End: 2022-07-25 | Stop reason: SURG

## 2022-07-25 RX ORDER — BUPIVACAINE HYDROCHLORIDE AND EPINEPHRINE 2.5; 5 MG/ML; UG/ML
INJECTION, SOLUTION EPIDURAL; INFILTRATION; INTRACAUDAL; PERINEURAL AS NEEDED
Status: DISCONTINUED | OUTPATIENT
Start: 2022-07-25 | End: 2022-07-25 | Stop reason: HOSPADM

## 2022-07-25 RX ORDER — MAGNESIUM HYDROXIDE 1200 MG/15ML
LIQUID ORAL AS NEEDED
Status: DISCONTINUED | OUTPATIENT
Start: 2022-07-25 | End: 2022-07-25 | Stop reason: HOSPADM

## 2022-07-25 RX ORDER — ONDANSETRON 2 MG/ML
4 INJECTION INTRAMUSCULAR; INTRAVENOUS EVERY 6 HOURS PRN
Status: DISCONTINUED | OUTPATIENT
Start: 2022-07-25 | End: 2022-07-27 | Stop reason: HOSPADM

## 2022-07-25 RX ORDER — LOSARTAN POTASSIUM 50 MG/1
100 TABLET ORAL
Refills: 0 | Status: DISCONTINUED | OUTPATIENT
Start: 2022-07-26 | End: 2022-07-27 | Stop reason: HOSPADM

## 2022-07-25 RX ORDER — LEVOTHYROXINE AND LIOTHYRONINE 19; 4.5 UG/1; UG/1
60 TABLET ORAL
Status: DISCONTINUED | OUTPATIENT
Start: 2022-07-26 | End: 2022-07-27 | Stop reason: HOSPADM

## 2022-07-25 RX ORDER — LIDOCAINE HYDROCHLORIDE 20 MG/ML
INJECTION, SOLUTION INFILTRATION; PERINEURAL AS NEEDED
Status: DISCONTINUED | OUTPATIENT
Start: 2022-07-25 | End: 2022-07-25 | Stop reason: SURG

## 2022-07-25 RX ORDER — HYDROCODONE BITARTRATE AND ACETAMINOPHEN 5; 325 MG/1; MG/1
1 TABLET ORAL ONCE AS NEEDED
Status: DISCONTINUED | OUTPATIENT
Start: 2022-07-25 | End: 2022-07-25 | Stop reason: HOSPADM

## 2022-07-25 RX ORDER — ONDANSETRON 2 MG/ML
4 INJECTION INTRAMUSCULAR; INTRAVENOUS ONCE AS NEEDED
Status: DISCONTINUED | OUTPATIENT
Start: 2022-07-25 | End: 2022-07-25 | Stop reason: HOSPADM

## 2022-07-25 RX ORDER — OXYCODONE AND ACETAMINOPHEN 10; 325 MG/1; MG/1
1 TABLET ORAL EVERY 4 HOURS PRN
Status: DISCONTINUED | OUTPATIENT
Start: 2022-07-25 | End: 2022-07-27 | Stop reason: HOSPADM

## 2022-07-25 RX ORDER — ONDANSETRON 2 MG/ML
INJECTION INTRAMUSCULAR; INTRAVENOUS AS NEEDED
Status: DISCONTINUED | OUTPATIENT
Start: 2022-07-25 | End: 2022-07-25 | Stop reason: SURG

## 2022-07-25 RX ORDER — CEFAZOLIN SODIUM IN 0.9 % NACL 2 G/100 ML
2 PLASTIC BAG, INJECTION (ML) INTRAVENOUS EVERY 8 HOURS
Status: COMPLETED | OUTPATIENT
Start: 2022-07-25 | End: 2022-07-26

## 2022-07-25 RX ORDER — SODIUM CHLORIDE 0.9 % (FLUSH) 0.9 %
3-10 SYRINGE (ML) INJECTION AS NEEDED
Status: DISCONTINUED | OUTPATIENT
Start: 2022-07-25 | End: 2022-07-25 | Stop reason: HOSPADM

## 2022-07-25 RX ORDER — PROMETHAZINE HYDROCHLORIDE 25 MG/1
25 SUPPOSITORY RECTAL ONCE AS NEEDED
Status: DISCONTINUED | OUTPATIENT
Start: 2022-07-25 | End: 2022-07-25 | Stop reason: HOSPADM

## 2022-07-25 RX ORDER — CEFAZOLIN SODIUM 2 G/100ML
2 INJECTION, SOLUTION INTRAVENOUS EVERY 8 HOURS
Status: DISCONTINUED | OUTPATIENT
Start: 2022-07-25 | End: 2022-07-25

## 2022-07-25 RX ORDER — FENTANYL CITRATE 50 UG/ML
INJECTION, SOLUTION INTRAMUSCULAR; INTRAVENOUS AS NEEDED
Status: DISCONTINUED | OUTPATIENT
Start: 2022-07-25 | End: 2022-07-25 | Stop reason: SURG

## 2022-07-25 RX ORDER — AMLODIPINE BESYLATE 10 MG/1
10 TABLET ORAL DAILY
Status: DISCONTINUED | OUTPATIENT
Start: 2022-07-26 | End: 2022-07-27 | Stop reason: HOSPADM

## 2022-07-25 RX ORDER — SODIUM CHLORIDE, SODIUM LACTATE, POTASSIUM CHLORIDE, CALCIUM CHLORIDE 600; 310; 30; 20 MG/100ML; MG/100ML; MG/100ML; MG/100ML
100 INJECTION, SOLUTION INTRAVENOUS CONTINUOUS
Status: DISCONTINUED | OUTPATIENT
Start: 2022-07-25 | End: 2022-07-27 | Stop reason: HOSPADM

## 2022-07-25 RX ORDER — CEFAZOLIN SODIUM 2 G/100ML
2 INJECTION, SOLUTION INTRAVENOUS ONCE
Status: DISCONTINUED | OUTPATIENT
Start: 2022-07-25 | End: 2022-07-25 | Stop reason: RX

## 2022-07-25 RX ORDER — FAMOTIDINE 20 MG/1
20 TABLET, FILM COATED ORAL ONCE
Status: COMPLETED | OUTPATIENT
Start: 2022-07-25 | End: 2022-07-25

## 2022-07-25 RX ORDER — FENTANYL CITRATE 50 UG/ML
50 INJECTION, SOLUTION INTRAMUSCULAR; INTRAVENOUS
Status: DISCONTINUED | OUTPATIENT
Start: 2022-07-25 | End: 2022-07-25 | Stop reason: HOSPADM

## 2022-07-25 RX ORDER — CETIRIZINE HYDROCHLORIDE 10 MG/1
5 TABLET ORAL DAILY
Status: DISCONTINUED | OUTPATIENT
Start: 2022-07-25 | End: 2022-07-27 | Stop reason: HOSPADM

## 2022-07-25 RX ORDER — PREGABALIN 75 MG/1
75 CAPSULE ORAL ONCE
Status: COMPLETED | OUTPATIENT
Start: 2022-07-25 | End: 2022-07-25

## 2022-07-25 RX ORDER — HYDROMORPHONE HCL 110MG/55ML
PATIENT CONTROLLED ANALGESIA SYRINGE INTRAVENOUS AS NEEDED
Status: DISCONTINUED | OUTPATIENT
Start: 2022-07-25 | End: 2022-07-25 | Stop reason: SURG

## 2022-07-25 RX ORDER — PROPOFOL 10 MG/ML
VIAL (ML) INTRAVENOUS AS NEEDED
Status: DISCONTINUED | OUTPATIENT
Start: 2022-07-25 | End: 2022-07-25 | Stop reason: SURG

## 2022-07-25 RX ORDER — DROPERIDOL 2.5 MG/ML
0.62 INJECTION, SOLUTION INTRAMUSCULAR; INTRAVENOUS
Status: DISCONTINUED | OUTPATIENT
Start: 2022-07-25 | End: 2022-07-25 | Stop reason: HOSPADM

## 2022-07-25 RX ORDER — DEXAMETHASONE SODIUM PHOSPHATE 10 MG/ML
INJECTION INTRAMUSCULAR; INTRAVENOUS AS NEEDED
Status: DISCONTINUED | OUTPATIENT
Start: 2022-07-25 | End: 2022-07-25 | Stop reason: SURG

## 2022-07-25 RX ORDER — BUPIVACAINE HCL/0.9 % NACL/PF 0.1 %
2 PLASTIC BAG, INJECTION (ML) EPIDURAL
Status: COMPLETED | OUTPATIENT
Start: 2022-07-25 | End: 2022-07-25

## 2022-07-25 RX ORDER — SODIUM CHLORIDE 0.9 % (FLUSH) 0.9 %
3-10 SYRINGE (ML) INJECTION AS NEEDED
Status: DISCONTINUED | OUTPATIENT
Start: 2022-07-25 | End: 2022-07-27 | Stop reason: HOSPADM

## 2022-07-25 RX ORDER — LIDOCAINE HYDROCHLORIDE 10 MG/ML
0.5 INJECTION, SOLUTION EPIDURAL; INFILTRATION; INTRACAUDAL; PERINEURAL ONCE AS NEEDED
Status: COMPLETED | OUTPATIENT
Start: 2022-07-25 | End: 2022-07-25

## 2022-07-25 RX ORDER — ROCURONIUM BROMIDE 10 MG/ML
INJECTION, SOLUTION INTRAVENOUS AS NEEDED
Status: DISCONTINUED | OUTPATIENT
Start: 2022-07-25 | End: 2022-07-25 | Stop reason: SURG

## 2022-07-25 RX ORDER — ACETAMINOPHEN 325 MG/1
650 TABLET ORAL EVERY 4 HOURS PRN
Status: DISCONTINUED | OUTPATIENT
Start: 2022-07-25 | End: 2022-07-27 | Stop reason: HOSPADM

## 2022-07-25 RX ORDER — ACETAMINOPHEN 500 MG
1000 TABLET ORAL ONCE
Status: COMPLETED | OUTPATIENT
Start: 2022-07-25 | End: 2022-07-25

## 2022-07-25 RX ORDER — FAMOTIDINE 10 MG/ML
20 INJECTION, SOLUTION INTRAVENOUS ONCE
Status: DISCONTINUED | OUTPATIENT
Start: 2022-07-25 | End: 2022-07-25

## 2022-07-25 RX ORDER — HYDROMORPHONE HYDROCHLORIDE 1 MG/ML
0.5 INJECTION, SOLUTION INTRAMUSCULAR; INTRAVENOUS; SUBCUTANEOUS
Status: DISCONTINUED | OUTPATIENT
Start: 2022-07-25 | End: 2022-07-25 | Stop reason: HOSPADM

## 2022-07-25 RX ORDER — PROMETHAZINE HYDROCHLORIDE 25 MG/1
25 TABLET ORAL ONCE AS NEEDED
Status: DISCONTINUED | OUTPATIENT
Start: 2022-07-25 | End: 2022-07-25 | Stop reason: HOSPADM

## 2022-07-25 RX ORDER — IPRATROPIUM BROMIDE AND ALBUTEROL SULFATE 2.5; .5 MG/3ML; MG/3ML
3 SOLUTION RESPIRATORY (INHALATION) ONCE AS NEEDED
Status: DISCONTINUED | OUTPATIENT
Start: 2022-07-25 | End: 2022-07-25 | Stop reason: HOSPADM

## 2022-07-25 RX ORDER — PHENYLEPHRINE HYDROCHLORIDE 10 MG/ML
INJECTION INTRAVENOUS AS NEEDED
Status: DISCONTINUED | OUTPATIENT
Start: 2022-07-25 | End: 2022-07-25 | Stop reason: SURG

## 2022-07-25 RX ORDER — PRAVASTATIN SODIUM 40 MG
40 TABLET ORAL NIGHTLY
Status: DISCONTINUED | OUTPATIENT
Start: 2022-07-25 | End: 2022-07-27 | Stop reason: HOSPADM

## 2022-07-25 RX ADMIN — PREGABALIN 75 MG: 75 CAPSULE ORAL at 06:22

## 2022-07-25 RX ADMIN — OXYCODONE HYDROCHLORIDE 10 MG: 10 TABLET, FILM COATED, EXTENDED RELEASE ORAL at 06:22

## 2022-07-25 RX ADMIN — Medication 2 G: at 07:43

## 2022-07-25 RX ADMIN — Medication 2 G: at 10:51

## 2022-07-25 RX ADMIN — MUPIROCIN 1 APPLICATION: 20 OINTMENT TOPICAL at 06:22

## 2022-07-25 RX ADMIN — DEXAMETHASONE SODIUM PHOSPHATE 8 MG: 10 INJECTION INTRAMUSCULAR; INTRAVENOUS at 07:43

## 2022-07-25 RX ADMIN — EPHEDRINE SULFATE 5 MG: 50 INJECTION INTRAVENOUS at 07:58

## 2022-07-25 RX ADMIN — HYDROMORPHONE HYDROCHLORIDE 0.25 MG: 2 INJECTION, SOLUTION INTRAMUSCULAR; INTRAVENOUS; SUBCUTANEOUS at 11:09

## 2022-07-25 RX ADMIN — FAMOTIDINE 20 MG: 20 TABLET ORAL at 20:14

## 2022-07-25 RX ADMIN — OXYCODONE HYDROCHLORIDE AND ACETAMINOPHEN 1 TABLET: 10; 325 TABLET ORAL at 14:17

## 2022-07-25 RX ADMIN — SUGAMMADEX 200 MG: 100 INJECTION, SOLUTION INTRAVENOUS at 11:21

## 2022-07-25 RX ADMIN — Medication 3 ML: at 20:14

## 2022-07-25 RX ADMIN — FENTANYL CITRATE 50 MCG: 50 INJECTION, SOLUTION INTRAMUSCULAR; INTRAVENOUS at 12:15

## 2022-07-25 RX ADMIN — EPHEDRINE SULFATE 5 MG: 50 INJECTION INTRAVENOUS at 09:37

## 2022-07-25 RX ADMIN — LIDOCAINE HYDROCHLORIDE 0.5 ML: 10 INJECTION, SOLUTION EPIDURAL; INFILTRATION; INTRACAUDAL; PERINEURAL at 06:44

## 2022-07-25 RX ADMIN — ACETAMINOPHEN 1000 MG: 500 TABLET ORAL at 06:22

## 2022-07-25 RX ADMIN — EPHEDRINE SULFATE 10 MG: 50 INJECTION INTRAVENOUS at 09:42

## 2022-07-25 RX ADMIN — PHENYLEPHRINE HYDROCHLORIDE 100 MCG: 10 INJECTION INTRAVENOUS at 10:09

## 2022-07-25 RX ADMIN — CEFAZOLIN 2 G: 10 INJECTION, POWDER, FOR SOLUTION INTRAVENOUS at 15:50

## 2022-07-25 RX ADMIN — SODIUM CHLORIDE, POTASSIUM CHLORIDE, SODIUM LACTATE AND CALCIUM CHLORIDE 9 ML/HR: 600; 310; 30; 20 INJECTION, SOLUTION INTRAVENOUS at 06:44

## 2022-07-25 RX ADMIN — CETIRIZINE HYDROCHLORIDE TABLETS 5 MG: 10 TABLET, FILM COATED ORAL at 14:17

## 2022-07-25 RX ADMIN — FENTANYL CITRATE 100 MCG: 50 INJECTION, SOLUTION INTRAMUSCULAR; INTRAVENOUS at 09:16

## 2022-07-25 RX ADMIN — FAMOTIDINE 20 MG: 20 TABLET ORAL at 06:43

## 2022-07-25 RX ADMIN — ROCURONIUM BROMIDE 50 MG: 10 INJECTION, SOLUTION INTRAVENOUS at 07:37

## 2022-07-25 RX ADMIN — PROPOFOL 100 MG: 10 INJECTION, EMULSION INTRAVENOUS at 07:37

## 2022-07-25 RX ADMIN — PHENYLEPHRINE HYDROCHLORIDE 100 MCG: 10 INJECTION INTRAVENOUS at 09:55

## 2022-07-25 RX ADMIN — ESMOLOL HYDROCHLORIDE 20 MG: 10 INJECTION, SOLUTION INTRAVENOUS at 07:37

## 2022-07-25 RX ADMIN — POTASSIUM CHLORIDE AND SODIUM CHLORIDE 100 ML/HR: 900; 150 INJECTION, SOLUTION INTRAVENOUS at 14:18

## 2022-07-25 RX ADMIN — PRAVASTATIN SODIUM 40 MG: 40 TABLET ORAL at 20:14

## 2022-07-25 RX ADMIN — ONDANSETRON 4 MG: 2 INJECTION INTRAMUSCULAR; INTRAVENOUS at 11:03

## 2022-07-25 RX ADMIN — EPHEDRINE SULFATE 5 MG: 50 INJECTION INTRAVENOUS at 08:26

## 2022-07-25 RX ADMIN — LIDOCAINE HYDROCHLORIDE 50 MG: 20 INJECTION, SOLUTION INFILTRATION; PERINEURAL at 07:37

## 2022-07-25 RX ADMIN — PHENYLEPHRINE HYDROCHLORIDE 100 MCG: 10 INJECTION INTRAVENOUS at 08:00

## 2022-07-25 NOTE — ANESTHESIA PROCEDURE NOTES
Airway  Urgency: elective    Date/Time: 7/25/2022 7:39 AM  Airway not difficult    General Information and Staff    Patient location during procedure: OR  SRNA: Alexa Cosme SRNA  Indications and Patient Condition  Indications for airway management: airway protection    Preoxygenated: yes  MILS not maintained throughout  Mask difficulty assessment: 1 - vent by mask    Final Airway Details  Final airway type: endotracheal airway      Successful airway: ETT  Cuffed: yes   Successful intubation technique: direct laryngoscopy  Endotracheal tube insertion site: oral  Blade: Nava  Blade size: 3  ETT size (mm): 7.0  Cormack-Lehane Classification: grade I - full view of glottis  Placement verified by: chest auscultation and capnometry   Measured from: lips  ETT/EBT  to lips (cm): 22  Number of attempts at approach: 1  Assessment: lips, teeth, and gum same as pre-op and atraumatic intubation    Additional Comments  Negative epigastric sounds, Breath sound equal bilaterally with symmetric chest rise and fall

## 2022-07-25 NOTE — ANESTHESIA PREPROCEDURE EVALUATION
Anesthesia Evaluation     Patient summary reviewed and Nursing notes reviewed   NPO Solid Status: > 8 hours  NPO Liquid Status: > 8 hours           Airway   Mallampati: I  TM distance: >3 FB  Neck ROM: full  No difficulty expected  Dental - normal exam     Pulmonary     breath sounds clear to auscultation  Cardiovascular   Exercise tolerance: good (4-7 METS)    Rhythm: regular  Rate: normal        Neuro/Psych  GI/Hepatic/Renal/Endo      Musculoskeletal     Abdominal    Substance History      OB/GYN          Other                        Anesthesia Plan    ASA 2     general     intravenous induction     Anesthetic plan, risks, benefits, and alternatives have been provided, discussed and informed consent has been obtained with: patient.        CODE STATUS:

## 2022-07-25 NOTE — ANESTHESIA POSTPROCEDURE EVALUATION
Patient: Kaylan Craig    Procedure Summary     Date: 07/25/22 Room / Location:  [facility] [address] [place] [address] [facility]    Anesthesia Start: 0734 Anesthesia Stop:     Procedure: DECOMPRESSION AND FUSION W/ PEDICLE SCREWS, CAGES AND ALLOGRAFT L 3-4-5 (N/A Spine Cervical) Diagnosis:     Surgeons: Eric Lebron MD Provider: Eric Manzo MD    Anesthesia Type: general ASA Status: 2          Anesthesia Type: general    Vitals  Vitals Value Taken Time   /49 07/25/22 1136   Temp     Pulse     Resp     SpO2 95 % 07/25/22 1138   Vitals shown include unvalidated device data.        Post Anesthesia Care and Evaluation    Patient location during evaluation: PACU  Patient participation: complete - patient participated  Level of consciousness: responsive to physical stimuli  Pain score: 0  Pain management: adequate    Airway patency: patent  Anesthetic complications: No anesthetic complications  PONV Status: none  Cardiovascular status: hemodynamically stable and acceptable  Respiratory status: nonlabored ventilation, acceptable, nasal cannula and spontaneous ventilation  Hydration status: acceptable  No anesthesia care post op

## 2022-07-26 PROBLEM — D62 ACUTE BLOOD LOSS ANEMIA: Status: ACTIVE | Noted: 2022-07-26

## 2022-07-26 LAB
HCT VFR BLD AUTO: 28 % (ref 34–46.6)
HGB BLD-MCNC: 8.9 G/DL (ref 12–15.9)

## 2022-07-26 PROCEDURE — 97116 GAIT TRAINING THERAPY: CPT

## 2022-07-26 PROCEDURE — 25010000002 CEFAZOLIN PER 500 MG: Performed by: ORTHOPAEDIC SURGERY

## 2022-07-26 PROCEDURE — 97110 THERAPEUTIC EXERCISES: CPT

## 2022-07-26 PROCEDURE — 97535 SELF CARE MNGMENT TRAINING: CPT

## 2022-07-26 PROCEDURE — 97165 OT EVAL LOW COMPLEX 30 MIN: CPT

## 2022-07-26 PROCEDURE — 85014 HEMATOCRIT: CPT | Performed by: ORTHOPAEDIC SURGERY

## 2022-07-26 PROCEDURE — 85018 HEMOGLOBIN: CPT | Performed by: ORTHOPAEDIC SURGERY

## 2022-07-26 RX ORDER — PREDNISOLONE ACETATE 10 MG/ML
1 SUSPENSION/ DROPS OPHTHALMIC DAILY
Status: DISCONTINUED | OUTPATIENT
Start: 2022-07-26 | End: 2022-07-27 | Stop reason: HOSPADM

## 2022-07-26 RX ADMIN — ACETAMINOPHEN 650 MG: 325 TABLET ORAL at 08:30

## 2022-07-26 RX ADMIN — CEFAZOLIN 2 G: 10 INJECTION, POWDER, FOR SOLUTION INTRAVENOUS at 00:07

## 2022-07-26 RX ADMIN — OXYCODONE HYDROCHLORIDE AND ACETAMINOPHEN 1 TABLET: 10; 325 TABLET ORAL at 16:54

## 2022-07-26 RX ADMIN — FAMOTIDINE 20 MG: 20 TABLET ORAL at 08:30

## 2022-07-26 RX ADMIN — CETIRIZINE HYDROCHLORIDE TABLETS 5 MG: 10 TABLET, FILM COATED ORAL at 08:29

## 2022-07-26 RX ADMIN — PREDNISOLONE ACETATE 1 DROP: 10 SUSPENSION/ DROPS OPHTHALMIC at 18:43

## 2022-07-26 RX ADMIN — THYROID, PORCINE 60 MG: 30 TABLET ORAL at 08:29

## 2022-07-26 RX ADMIN — HYDROCODONE BITARTRATE AND ACETAMINOPHEN 1 TABLET: 10; 325 TABLET ORAL at 00:11

## 2022-07-26 RX ADMIN — HYDROCODONE BITARTRATE AND ACETAMINOPHEN 1 TABLET: 10; 325 TABLET ORAL at 11:32

## 2022-07-26 RX ADMIN — Medication 3 ML: at 20:52

## 2022-07-26 RX ADMIN — FAMOTIDINE 20 MG: 20 TABLET ORAL at 20:52

## 2022-07-26 RX ADMIN — OXYCODONE HYDROCHLORIDE AND ACETAMINOPHEN 1 TABLET: 10; 325 TABLET ORAL at 04:09

## 2022-07-26 RX ADMIN — PRAVASTATIN SODIUM 40 MG: 40 TABLET ORAL at 20:52

## 2022-07-27 VITALS
OXYGEN SATURATION: 96 % | DIASTOLIC BLOOD PRESSURE: 47 MMHG | RESPIRATION RATE: 16 BRPM | HEIGHT: 63 IN | HEART RATE: 82 BPM | SYSTOLIC BLOOD PRESSURE: 107 MMHG | WEIGHT: 135 LBS | TEMPERATURE: 99.4 F | BODY MASS INDEX: 23.92 KG/M2

## 2022-07-27 PROBLEM — G89.18 ACUTE POSTOPERATIVE PAIN: Status: ACTIVE | Noted: 2022-07-27

## 2022-07-27 LAB
HCT VFR BLD AUTO: 26.2 % (ref 34–46.6)
HGB BLD-MCNC: 8.7 G/DL (ref 12–15.9)

## 2022-07-27 PROCEDURE — 85018 HEMOGLOBIN: CPT

## 2022-07-27 PROCEDURE — 97116 GAIT TRAINING THERAPY: CPT

## 2022-07-27 PROCEDURE — 85014 HEMATOCRIT: CPT

## 2022-07-27 RX ORDER — HYDROCODONE BITARTRATE AND ACETAMINOPHEN 10; 325 MG/1; MG/1
1 TABLET ORAL EVERY 6 HOURS PRN
Qty: 45 TABLET | Refills: 0 | Status: SHIPPED | OUTPATIENT
Start: 2022-07-27 | End: 2022-12-13

## 2022-07-27 RX ADMIN — PREDNISOLONE ACETATE 1 DROP: 10 SUSPENSION/ DROPS OPHTHALMIC at 08:29

## 2022-07-27 RX ADMIN — HYDROCODONE BITARTRATE AND ACETAMINOPHEN 1 TABLET: 10; 325 TABLET ORAL at 12:18

## 2022-07-27 RX ADMIN — Medication 3 ML: at 08:34

## 2022-07-27 RX ADMIN — CETIRIZINE HYDROCHLORIDE TABLETS 5 MG: 10 TABLET, FILM COATED ORAL at 08:29

## 2022-07-27 RX ADMIN — FAMOTIDINE 20 MG: 20 TABLET ORAL at 08:29

## 2022-07-27 RX ADMIN — FUROSEMIDE 40 MG: 40 TABLET ORAL at 08:29

## 2022-07-27 RX ADMIN — THYROID, PORCINE 60 MG: 30 TABLET ORAL at 06:19

## 2022-07-27 RX ADMIN — HYDROCODONE BITARTRATE AND ACETAMINOPHEN 1 TABLET: 10; 325 TABLET ORAL at 06:39

## 2022-08-23 ENCOUNTER — TELEPHONE (OUTPATIENT)
Dept: ORTHOPEDIC SURGERY | Facility: CLINIC | Age: 82
End: 2022-08-23

## 2022-08-30 ENCOUNTER — CLINICAL SUPPORT (OUTPATIENT)
Dept: ORTHOPEDIC SURGERY | Facility: CLINIC | Age: 82
End: 2022-08-30

## 2022-08-30 DIAGNOSIS — M17.11 PRIMARY OSTEOARTHRITIS OF RIGHT KNEE: Primary | ICD-10-CM

## 2022-08-30 PROCEDURE — 20610 DRAIN/INJ JOINT/BURSA W/O US: CPT | Performed by: ORTHOPAEDIC SURGERY

## 2022-08-30 RX ORDER — LIDOCAINE 50 MG/G
PATCH TOPICAL
COMMUNITY
Start: 2022-08-09

## 2022-08-30 RX ORDER — GABAPENTIN 300 MG/1
CAPSULE ORAL NIGHTLY
COMMUNITY
Start: 2022-08-02 | End: 2022-12-13

## 2022-08-30 RX ORDER — HYDROCODONE BITARTRATE AND ACETAMINOPHEN 7.5; 325 MG/1; MG/1
1 TABLET ORAL EVERY 6 HOURS PRN
COMMUNITY
Start: 2022-07-11 | End: 2022-12-13

## 2022-08-30 NOTE — PROGRESS NOTES
Procedure   - Large Joint Arthrocentesis: R knee on 8/30/2022 2:28 PM  Indications: pain  Details: 22 G needle, anterolateral approach  Medications: 30 mg Hyaluronan 30 MG/2ML  Outcome: tolerated well, no immediate complications  Procedure, treatment alternatives, risks and benefits explained, specific risks discussed. Consent was given by the patient. Immediately prior to procedure a time out was called to verify the correct patient, procedure, equipment, support staff and site/side marked as required. Patient was prepped and draped in the usual sterile fashion.

## 2022-09-06 ENCOUNTER — CLINICAL SUPPORT (OUTPATIENT)
Dept: ORTHOPEDIC SURGERY | Facility: CLINIC | Age: 82
End: 2022-09-06

## 2022-09-06 DIAGNOSIS — M17.11 PRIMARY OSTEOARTHRITIS OF RIGHT KNEE: Primary | ICD-10-CM

## 2022-09-06 PROCEDURE — 20610 DRAIN/INJ JOINT/BURSA W/O US: CPT | Performed by: ORTHOPAEDIC SURGERY

## 2022-09-06 RX ORDER — LIDOCAINE HYDROCHLORIDE 10 MG/ML
3 INJECTION, SOLUTION EPIDURAL; INFILTRATION; INTRACAUDAL; PERINEURAL
Status: COMPLETED | OUTPATIENT
Start: 2022-09-06 | End: 2022-09-06

## 2022-09-06 RX ADMIN — LIDOCAINE HYDROCHLORIDE 3 ML: 10 INJECTION, SOLUTION EPIDURAL; INFILTRATION; INTRACAUDAL; PERINEURAL at 13:28

## 2022-09-06 NOTE — PROGRESS NOTES
Procedure   Large Joint Arthrocentesis: R knee  Date/Time: 9/6/2022 1:28 PM  Consent given by: patient  Site marked: site marked  Timeout: Immediately prior to procedure a time out was called to verify the correct patient, procedure, equipment, support staff and site/side marked as required   Supporting Documentation  Indications: pain   Procedure Details  Location: knee - R knee  Preparation: Patient was prepped and draped in the usual sterile fashion  Needle size: 22 G  Approach: anterolateral  Medications administered: 30 mg Hyaluronan 30 MG/2ML; 3 mL lidocaine PF 1% 1 %  Patient tolerance: patient tolerated the procedure well with no immediate complications

## 2022-09-06 NOTE — PROGRESS NOTES
Procedure Note:    I discussed with the patient the potential benefits of performing a therapeutic injections as well as potential risks including but not limited to infection, swelling, pain, bleeding, bruising, nerve/vessel damage, skin color changes, transient elevation in blood glucose levels, and fat atrophy. After informed consent and after the areas were prepped with chlorhexadine soap, ethyl chloride was used to numb the skin. Via the superiorlateral approach, 3mL of 1% lidocaine followed by Orthovisc No. 2 were each injected into the right knee joint. The patient tolerated the procedure well. There were no complications. A sterile dressing was placed over the injection sites.

## 2022-09-13 ENCOUNTER — CLINICAL SUPPORT (OUTPATIENT)
Dept: ORTHOPEDIC SURGERY | Facility: CLINIC | Age: 82
End: 2022-09-13

## 2022-09-13 DIAGNOSIS — M17.11 PRIMARY OSTEOARTHRITIS OF RIGHT KNEE: Primary | ICD-10-CM

## 2022-09-13 PROCEDURE — 20610 DRAIN/INJ JOINT/BURSA W/O US: CPT | Performed by: ORTHOPAEDIC SURGERY

## 2022-09-13 RX ORDER — LIDOCAINE HYDROCHLORIDE 10 MG/ML
3 INJECTION, SOLUTION EPIDURAL; INFILTRATION; INTRACAUDAL; PERINEURAL
Status: COMPLETED | OUTPATIENT
Start: 2022-09-13 | End: 2022-09-13

## 2022-09-13 RX ADMIN — LIDOCAINE HYDROCHLORIDE 3 ML: 10 INJECTION, SOLUTION EPIDURAL; INFILTRATION; INTRACAUDAL; PERINEURAL at 08:28

## 2022-09-13 NOTE — PROGRESS NOTES
Procedure Note:    I discussed with the patient the potential benefits of performing a therapeutic injections as well as potential risks including but not limited to infection, swelling, pain, bleeding, bruising, nerve/vessel damage, skin color changes, transient elevation in blood glucose levels, and fat atrophy. After informed consent and after the areas were prepped with chlorhexadine soap, ethyl chloride was used to numb the skin. Via the superiorlateral approach, 3mL of 1% lidocaine followed by Orthovisc No. 3 were each injected into the right knee joint. The patient tolerated the procedure well. There were no complications. A sterile dressing was placed over the injection sites.      Follow-up in 3 months

## 2022-09-13 NOTE — PROGRESS NOTES
Procedure   Large Joint Arthrocentesis: R knee  Date/Time: 9/13/2022 8:28 AM  Consent given by: patient  Site marked: site marked  Timeout: Immediately prior to procedure a time out was called to verify the correct patient, procedure, equipment, support staff and site/side marked as required   Supporting Documentation  Indications: pain   Procedure Details  Location: knee - R knee  Preparation: Patient was prepped and draped in the usual sterile fashion  Needle size: 22 G  Approach: anterolateral  Medications administered: 3 mL lidocaine PF 1% 1 %; 30 mg Hyaluronan 30 MG/2ML  Patient tolerance: patient tolerated the procedure well with no immediate complications

## 2022-12-13 ENCOUNTER — OFFICE VISIT (OUTPATIENT)
Dept: ORTHOPEDIC SURGERY | Facility: CLINIC | Age: 82
End: 2022-12-13

## 2022-12-13 VITALS
HEIGHT: 63 IN | SYSTOLIC BLOOD PRESSURE: 155 MMHG | WEIGHT: 134 LBS | DIASTOLIC BLOOD PRESSURE: 72 MMHG | BODY MASS INDEX: 23.74 KG/M2

## 2022-12-13 DIAGNOSIS — M17.11 PRIMARY OSTEOARTHRITIS OF RIGHT KNEE: Primary | ICD-10-CM

## 2022-12-13 PROCEDURE — 20610 DRAIN/INJ JOINT/BURSA W/O US: CPT | Performed by: ORTHOPAEDIC SURGERY

## 2022-12-13 RX ORDER — TRIAMCINOLONE ACETONIDE 40 MG/ML
40 INJECTION, SUSPENSION INTRA-ARTICULAR; INTRAMUSCULAR
Status: COMPLETED | OUTPATIENT
Start: 2022-12-13 | End: 2022-12-13

## 2022-12-13 RX ORDER — LIDOCAINE HYDROCHLORIDE 10 MG/ML
3 INJECTION, SOLUTION EPIDURAL; INFILTRATION; INTRACAUDAL; PERINEURAL
Status: COMPLETED | OUTPATIENT
Start: 2022-12-13 | End: 2022-12-13

## 2022-12-13 RX ADMIN — TRIAMCINOLONE ACETONIDE 40 MG: 40 INJECTION, SUSPENSION INTRA-ARTICULAR; INTRAMUSCULAR at 11:14

## 2022-12-13 RX ADMIN — LIDOCAINE HYDROCHLORIDE 3 ML: 10 INJECTION, SOLUTION EPIDURAL; INFILTRATION; INTRACAUDAL; PERINEURAL at 11:14

## 2022-12-13 NOTE — PROGRESS NOTES
Procedure   Large Joint Arthrocentesis: R knee  Date/Time: 12/13/2022 11:14 AM  Consent given by: patient  Site marked: site marked  Timeout: Immediately prior to procedure a time out was called to verify the correct patient, procedure, equipment, support staff and site/side marked as required   Supporting Documentation  Indications: pain   Procedure Details  Location: knee - R knee  Needle size: 25 G  Approach: anterolateral  Medications administered: 3 mL lidocaine PF 1% 1 %; 40 mg triamcinolone acetonide 40 MG/ML  Patient tolerance: patient tolerated the procedure well with no immediate complications

## 2022-12-13 NOTE — PROGRESS NOTES
[facility] [place] [facility] [place]        Subjective     CC: Follow-up (3 month follow up; Primary osteoarthritis of right knee-completed OrthoVisc series at last visit on 9/13/22)      HPI    Kaylan Craig is a 82 y.o. female.  Patient completing Ortho series on 9/13/2022.  She tells me that it helped her but she still having little bit of posterior knee pain.  Has a lot going on in her life and cannot consider any type of surgical intervention.    Overall, patient's symptoms are as above.    ROS:    Constiutional:Pt denies fever, chills, nausea, or vomiting.  MSK:as above        Objective      Past Medical History  Past Medical History:   Diagnosis Date   • Anemia     Blood Loss requiring blood transfusions during her hospitalizatoin at .    • Arthritis    • ASCUS favor benign    • Atrophic vaginitis    • Breast injury 10/2021    left breast bruising all over from falling on steps.   • Disease of thyroid gland    • Hypertension    • Leg pain    • Macular degeneration of both eyes     shots in both eyes monthly   • Seasonal allergies    • Wears glasses      Social History     Socioeconomic History   • Marital status:    • Number of children: 3   Tobacco Use   • Smoking status: Never   • Smokeless tobacco: Never   Vaping Use   • Vaping Use: Never used   Substance and Sexual Activity   • Alcohol use: No   • Drug use: No   • Sexual activity: Yes     Partners: Male     Birth control/protection: Post-menopausal          Assessment    Assessment:  1. Primary osteoarthritis of right knee        Plan:  1. Recommend over the counter anti-inflammatories for pain and/or swelling  2. Osteoarthritis right knee--plan to be for steroid injection today.  Follow-up in 3 months for consideration of repeat Orthovisc series.      Procedure Note:    I discussed with the patient the potential benefits of performing a therapeutic injections as well as potential risks including but not limited to infection, swelling,  pain, bleeding, bruising, nerve/vessel damage, skin color changes, transient elevation in blood glucose levels, and fat atrophy. After informed consent and after the areas were prepped with chlorhexadine soap, ethyl chloride was used to numb the skin. Via the anterolateral approach, 3mL of 1% lidocaine followed by 40mg of Kenalog were each injected into the right knee joint. The patient tolerated the procedure well. There were no complications. A sterile dressing was placed over the injection sites.      Santosh Xie MD  12/13/22  11:19 EST      Dictated Utilizing Dragon Dictation.

## 2023-01-03 ENCOUNTER — TRANSCRIBE ORDERS (OUTPATIENT)
Dept: ADMINISTRATIVE | Facility: HOSPITAL | Age: 83
End: 2023-01-03
Payer: MEDICARE

## 2023-01-03 DIAGNOSIS — Z12.31 VISIT FOR SCREENING MAMMOGRAM: Primary | ICD-10-CM

## 2023-03-01 ENCOUNTER — HOSPITAL ENCOUNTER (OUTPATIENT)
Dept: MAMMOGRAPHY | Facility: HOSPITAL | Age: 83
Discharge: HOME | End: 2023-03-01
Admitting: GENERAL PRACTICE
Payer: MEDICARE

## 2023-03-01 DIAGNOSIS — Z12.31 VISIT FOR SCREENING MAMMOGRAM: ICD-10-CM

## 2023-03-01 PROCEDURE — 77063 BREAST TOMOSYNTHESIS BI: CPT

## 2023-03-01 PROCEDURE — 77067 SCR MAMMO BI INCL CAD: CPT

## 2023-03-01 PROCEDURE — 77067 SCR MAMMO BI INCL CAD: CPT | Performed by: RADIOLOGY

## 2023-03-01 PROCEDURE — 77063 BREAST TOMOSYNTHESIS BI: CPT | Performed by: RADIOLOGY

## 2023-03-14 ENCOUNTER — OFFICE VISIT (OUTPATIENT)
Dept: ORTHOPEDIC SURGERY | Facility: CLINIC | Age: 83
End: 2023-03-14
Payer: MEDICARE

## 2023-03-14 VITALS
DIASTOLIC BLOOD PRESSURE: 58 MMHG | SYSTOLIC BLOOD PRESSURE: 134 MMHG | HEIGHT: 63 IN | WEIGHT: 138.2 LBS | BODY MASS INDEX: 24.49 KG/M2

## 2023-03-14 DIAGNOSIS — M17.11 PRIMARY OSTEOARTHRITIS OF RIGHT KNEE: Primary | ICD-10-CM

## 2023-03-14 PROCEDURE — 3078F DIAST BP <80 MM HG: CPT | Performed by: ORTHOPAEDIC SURGERY

## 2023-03-14 PROCEDURE — 99213 OFFICE O/P EST LOW 20 MIN: CPT | Performed by: ORTHOPAEDIC SURGERY

## 2023-03-14 PROCEDURE — 3075F SYST BP GE 130 - 139MM HG: CPT | Performed by: ORTHOPAEDIC SURGERY

## 2023-03-14 NOTE — PROGRESS NOTES
"    [facility] [place] [facility] [place]        Subjective     CC: Follow-up (3 month follow up -- Primary osteoarthritis of right knee)      HPI    Kaylan Craig is a 82 y.o. female.  Patient is here today for follow-up of her right knee arthritis.  She says she is not having a lot of discomfort.  She is getting excellent relief long-term from the Orthovisc.  Was injected with corticosteroid at her last visit in December which helped her as well.    Overall, patient's symptoms are as above.    ROS:    Constiutional:Pt denies fever, chills, nausea, or vomiting.  MSK:as above        Objective      Past Medical History  Past Medical History:   Diagnosis Date   • Anemia     Blood Loss requiring blood transfusions during her hospitalizatoin at .    • Arthritis    • Arthritis of back    • ASCUS favor benign    • Atrophic vaginitis    • Breast injury 10/2021    left breast bruising all over from falling on steps.   • Disease of thyroid gland    • Hypertension    • Leg pain    • Macular degeneration of both eyes     shots in both eyes monthly   • Seasonal allergies    • Wears glasses      Social History     Socioeconomic History   • Marital status:    • Number of children: 3   Tobacco Use   • Smoking status: Never   • Smokeless tobacco: Never   Vaping Use   • Vaping Use: Never used   Substance and Sexual Activity   • Alcohol use: No   • Drug use: No   • Sexual activity: Yes     Partners: Male     Birth control/protection: Tubal ligation, Same-sex partner          Physical Exam  /58   Ht 160 cm (62.99\")   Wt 62.7 kg (138 lb 3.2 oz)   LMP  (LMP Unknown)   BMI 24.49 kg/m²     Body mass index is 24.49 kg/m².    Patient is well nourished and well developed.        Ortho Exam      Musculoskeletal:  Global Assessment:  Overall assessment of Lower Extremity Muscle Strength and Tone:  Right quadriceps--5/5   Right hamstrings--5/5       Right tibialis anterior--5/5  Right gastroc-soleus--5/5  Right EHL " --5/5    Lower Extremity:    Inspection and Palpation:  Right knee:  Tenderness:  Over the medial joint line and moderate severity  Effusion:  1+  Crepitus:  Positive  Pulses:  2+  Ecchymosis:  None  Warmth:  None     ROM:  Right:  Extension: 5    Flexion:120    Instability:    Right:  Lachman Test:  Negative   Varus stress test negative   Valgus stress test negative    Deformities/Malalignments/Discrepancies:    Left:  No deformities   Right:  Genu Varum    Functional Testing:  Aric's test:  Negative  Patella grind test:  Positive  Q-angle:  normal          Imaging/Labs/EMG Reviewed:  Imaging Results (Last 24 Hours)     ** No results found for the last 24 hours. **            Assessment    Assessment:  1. Primary osteoarthritis of right knee        Plan:  1. Recommend over the counter anti-inflammatories for pain and/or swelling  2. Severe right knee arthritis--patient is currently successfully managed with alternating viscosupplement injections and steroid injections.  We will see her back for injection #1.  We will try to expedite approval.      Santosh Xie MD  03/14/23  09:32 EDT      Dictated Utilizing Dragon Dictation.

## 2023-04-06 ENCOUNTER — TELEPHONE (OUTPATIENT)
Dept: ORTHOPEDIC SURGERY | Facility: CLINIC | Age: 83
End: 2023-04-06
Payer: MEDICARE

## 2023-04-25 ENCOUNTER — CLINICAL SUPPORT (OUTPATIENT)
Dept: ORTHOPEDIC SURGERY | Facility: CLINIC | Age: 83
End: 2023-04-25
Payer: MEDICARE

## 2023-04-25 DIAGNOSIS — M17.11 PRIMARY OSTEOARTHRITIS OF RIGHT KNEE: Primary | ICD-10-CM

## 2023-04-25 RX ORDER — LIDOCAINE HYDROCHLORIDE 10 MG/ML
3 INJECTION, SOLUTION EPIDURAL; INFILTRATION; INTRACAUDAL; PERINEURAL
Status: COMPLETED | OUTPATIENT
Start: 2023-04-25 | End: 2023-04-25

## 2023-04-25 RX ADMIN — LIDOCAINE HYDROCHLORIDE 3 ML: 10 INJECTION, SOLUTION EPIDURAL; INFILTRATION; INTRACAUDAL; PERINEURAL at 08:47

## 2023-04-25 NOTE — PROGRESS NOTES
Procedure   Large Joint Arthrocentesis: R knee  Date/Time: 4/25/2023 8:47 AM  Consent given by: patient  Site marked: site marked  Timeout: Immediately prior to procedure a time out was called to verify the correct patient, procedure, equipment, support staff and site/side marked as required   Supporting Documentation  Indications: pain   Procedure Details  Location: knee - R knee  Preparation: Patient was prepped and draped in the usual sterile fashion  Needle gauge: 21G.  Approach: anterolateral  Medications administered: 30 mg Hyaluronan 30 MG/2ML; 3 mL lidocaine PF 1% 1 %  Patient tolerance: patient tolerated the procedure well with no immediate complications

## 2023-05-02 ENCOUNTER — CLINICAL SUPPORT (OUTPATIENT)
Dept: ORTHOPEDIC SURGERY | Facility: CLINIC | Age: 83
End: 2023-05-02
Payer: MEDICARE

## 2023-05-02 DIAGNOSIS — M17.11 PRIMARY OSTEOARTHRITIS OF RIGHT KNEE: Primary | ICD-10-CM

## 2023-05-02 RX ORDER — LIDOCAINE HYDROCHLORIDE 10 MG/ML
3 INJECTION, SOLUTION EPIDURAL; INFILTRATION; INTRACAUDAL; PERINEURAL
Status: COMPLETED | OUTPATIENT
Start: 2023-05-02 | End: 2023-05-02

## 2023-05-02 RX ADMIN — LIDOCAINE HYDROCHLORIDE 3 ML: 10 INJECTION, SOLUTION EPIDURAL; INFILTRATION; INTRACAUDAL; PERINEURAL at 09:17

## 2023-05-02 NOTE — PROGRESS NOTES
Procedure   Large Joint Arthrocentesis: R knee  Date/Time: 5/2/2023 9:17 AM  Consent given by: patient  Site marked: site marked  Timeout: Immediately prior to procedure a time out was called to verify the correct patient, procedure, equipment, support staff and site/side marked as required   Supporting Documentation  Indications: pain   Procedure Details  Location: knee - R knee  Preparation: Patient was prepped and draped in the usual sterile fashion  Needle gauge: 21G.  Approach: anterolateral  Medications administered: 30 mg Hyaluronan 30 MG/2ML; 3 mL lidocaine PF 1% 1 %  Patient tolerance: patient tolerated the procedure well with no immediate complications

## 2023-05-03 ENCOUNTER — HOSPITAL ENCOUNTER (OUTPATIENT)
Dept: MAMMOGRAPHY | Facility: HOSPITAL | Age: 83
Discharge: HOME | End: 2023-05-03
Payer: MEDICARE

## 2023-05-03 ENCOUNTER — HOSPITAL ENCOUNTER (OUTPATIENT)
Dept: ULTRASOUND IMAGING | Facility: HOSPITAL | Age: 83
Discharge: HOME | End: 2023-05-03
Payer: MEDICARE

## 2023-05-03 DIAGNOSIS — R92.8 ABNORMAL MAMMOGRAM: ICD-10-CM

## 2023-05-03 PROCEDURE — G0279 TOMOSYNTHESIS, MAMMO: HCPCS

## 2023-05-03 PROCEDURE — 77065 DX MAMMO INCL CAD UNI: CPT

## 2023-05-03 PROCEDURE — 76642 ULTRASOUND BREAST LIMITED: CPT

## 2023-05-09 ENCOUNTER — CLINICAL SUPPORT (OUTPATIENT)
Dept: ORTHOPEDIC SURGERY | Facility: CLINIC | Age: 83
End: 2023-05-09
Payer: MEDICARE

## 2023-05-09 DIAGNOSIS — M17.11 PRIMARY OSTEOARTHRITIS OF RIGHT KNEE: Primary | ICD-10-CM

## 2023-05-09 RX ORDER — LIDOCAINE HYDROCHLORIDE 10 MG/ML
3 INJECTION, SOLUTION EPIDURAL; INFILTRATION; INTRACAUDAL; PERINEURAL
Status: COMPLETED | OUTPATIENT
Start: 2023-05-09 | End: 2023-05-09

## 2023-05-09 RX ADMIN — LIDOCAINE HYDROCHLORIDE 3 ML: 10 INJECTION, SOLUTION EPIDURAL; INFILTRATION; INTRACAUDAL; PERINEURAL at 09:02

## 2023-05-09 NOTE — PROGRESS NOTES
Procedure   - Large Joint Arthrocentesis: R knee on 5/9/2023 9:02 AM  Indications: pain  Details: (23 g) needle, superolateral approach  Medications: 3 mL lidocaine PF 1% 1 %; 30 mg Hyaluronan 30 MG/2ML  Outcome: tolerated well, no immediate complications  Procedure, treatment alternatives, risks and benefits explained, specific risks discussed. Consent was given by the patient. Immediately prior to procedure a time out was called to verify the correct patient, procedure, equipment, support staff and site/side marked as required. Patient was prepped and draped in the usual sterile fashion.

## 2023-08-10 ENCOUNTER — OFFICE VISIT (OUTPATIENT)
Dept: ORTHOPEDIC SURGERY | Facility: CLINIC | Age: 83
End: 2023-08-10
Payer: MEDICARE

## 2023-08-10 VITALS
SYSTOLIC BLOOD PRESSURE: 118 MMHG | BODY MASS INDEX: 23.92 KG/M2 | DIASTOLIC BLOOD PRESSURE: 74 MMHG | HEIGHT: 63 IN | WEIGHT: 135 LBS

## 2023-08-10 DIAGNOSIS — M17.11 PRIMARY OSTEOARTHRITIS OF RIGHT KNEE: Primary | ICD-10-CM

## 2023-08-10 RX ORDER — TRIAMCINOLONE ACETONIDE 40 MG/ML
40 INJECTION, SUSPENSION INTRA-ARTICULAR; INTRAMUSCULAR
Status: COMPLETED | OUTPATIENT
Start: 2023-08-10 | End: 2023-08-10

## 2023-08-10 RX ORDER — LIDOCAINE HYDROCHLORIDE 10 MG/ML
3 INJECTION, SOLUTION EPIDURAL; INFILTRATION; INTRACAUDAL; PERINEURAL
Status: COMPLETED | OUTPATIENT
Start: 2023-08-10 | End: 2023-08-10

## 2023-08-10 RX ADMIN — TRIAMCINOLONE ACETONIDE 40 MG: 40 INJECTION, SUSPENSION INTRA-ARTICULAR; INTRAMUSCULAR at 09:37

## 2023-08-10 RX ADMIN — LIDOCAINE HYDROCHLORIDE 3 ML: 10 INJECTION, SOLUTION EPIDURAL; INFILTRATION; INTRACAUDAL; PERINEURAL at 09:37

## 2023-08-10 NOTE — PROGRESS NOTES
"    [facility] [place] [facility] [place]        Subjective     CC: Follow-up (3 month recheck- Primary osteoarthritis of right knee)      HPI    Kaylan Craig is a 83 y.o. female.  Patient here today for follow-up of her right knee arthritis.  It has been more than a year since her last set of x-rays.  She says that she has some swelling in the back of the knee.  She still able to walk her new border collie 2 miles a day.    Overall, patient's symptoms are as above    ROS:    Constiutional:Pt denies fever, chills, nausea, or vomiting.  MSK:as above        Objective      Past Medical History  Past Medical History:   Diagnosis Date    Anemia     Blood Loss requiring blood transfusions during her hospitalizatoin at .     Arthritis     Arthritis of back     ASCUS favor benign     Atrophic vaginitis     Breast injury 10/2021    left breast bruising all over from falling on steps.    Disease of thyroid gland     Hypertension     Leg pain     Macular degeneration of both eyes     shots in both eyes monthly    Seasonal allergies     Wears glasses      Social History     Socioeconomic History    Marital status:     Number of children: 3   Tobacco Use    Smoking status: Never    Smokeless tobacco: Never   Vaping Use    Vaping Use: Never used   Substance and Sexual Activity    Alcohol use: No    Drug use: No    Sexual activity: Yes     Partners: Male     Birth control/protection: Tubal ligation, Same-sex partner          Physical Exam  /74   Ht 160 cm (62.99\")   Wt 61.2 kg (135 lb)   LMP  (LMP Unknown)   BMI 23.92 kg/mý     Body mass index is 23.92 kg/mý.    Patient is well nourished and well developed.        Ortho Exam      Musculoskeletal:  Global Assessment:  Overall assessment of Lower Extremity Muscle Strength and Tone:  Right quadriceps--5/5   Right hamstrings--5/5       Right tibialis anterior--5/5  Right gastroc-soleus--5/5  Right EHL --5/5    Lower Extremity:    Inspection and Palpation:  Right " knee:  Tenderness:  Over the medial joint line and moderate severity  Effusion:  1+  Crepitus:  Positive  Pulses:  2+  Ecchymosis:  None  Warmth:  None     ROM:  Right:  Extension: 5    Flexion:120    Instability:    Right:  Lachman Test:  Negative   Varus stress test negative   Valgus stress test negative    Deformities/Malalignments/Discrepancies:    Left:  No deformities   Right:  Genu Varum    Functional Testing:  Aric's test:  Negative  Patella grind test:  Positive  Q-angle:  normal        Imaging/Labs/EMG Reviewed:  Imaging Results (Last 24 Hours)       Procedure Component Value Units Date/Time    XR Knee 4+ View Right [[id number]] Resulted: 08/10/23 0904     Updated: 08/10/23 0904    Narrative:      Knee X-Ray    Indication: Pain    Study:  Upright AP, Skiers, Lateral, and Sunrise views of Right knee(s)    Comparison: Right knee 4/12/2022    Findings:    Patient appears to have severe hypertrophic degenerative changes in the   medial compartment.    There are mild degenerative changes in the lateral compartment.    There are severe changes in the patellofemoral compartment.    Patient has overall varus alignment.    Kellgren-José thGthrthathdtheth:th th5th Impression:   Severe hypertrophic medial compartment and severe patellofemoral   compartment degnerative changes of the knee               Assessment    Assessment:  1. Primary osteoarthritis of right knee        Plan:  Recommend over the counter anti-inflammatories for pain and/or swelling  Right knee arthritis--severe degenerative changes but patient functionally is doing quite well and has minimal complaints of pain.  Steroid injection will be given today and we will see her back early November to repeat her Orthovisc series which will be ordered today.      Procedure Note:    I discussed with the patient the potential benefits of performing a therapeutic injections as well as potential risks including but not limited to infection, swelling, pain, bleeding,  bruising, nerve/vessel damage, skin color changes, transient elevation in blood glucose levels, and fat atrophy. After informed consent and after the areas were prepped with chlorhexadine soap, ethyl chloride was used to numb the skin. Via the anterolateral approach, 3mL of 1% lidocaine followed by 40mg of Kenalog were each injected into the right knee joint. The patient tolerated the procedure well. There were no complications. A sterile dressing was placed over the injection sites.        Santosh Xie MD  08/10/23  09:41 EDT      Dictated Utilizing Dragon Dictation.

## 2023-08-10 NOTE — PROGRESS NOTES
Procedure   - Large Joint Arthrocentesis: R knee on 8/10/2023 9:37 AM  Indications: pain  Details: (23g) needle, anterolateral approach  Medications: 3 mL lidocaine PF 1% 1 %; 40 mg triamcinolone acetonide 40 MG/ML  Outcome: tolerated well, no immediate complications  Procedure, treatment alternatives, risks and benefits explained, specific risks discussed. Consent was given by the patient. Immediately prior to procedure a time out was called to verify the correct patient, procedure, equipment, support staff and site/side marked as required. Patient was prepped and draped in the usual sterile fashion.

## 2023-11-14 ENCOUNTER — CLINICAL SUPPORT (OUTPATIENT)
Dept: ORTHOPEDIC SURGERY | Facility: CLINIC | Age: 83
End: 2023-11-14
Payer: MEDICARE

## 2023-11-14 DIAGNOSIS — M17.11 PRIMARY OSTEOARTHRITIS OF RIGHT KNEE: Primary | ICD-10-CM

## 2023-11-14 PROCEDURE — 20610 DRAIN/INJ JOINT/BURSA W/O US: CPT | Performed by: ORTHOPAEDIC SURGERY

## 2023-11-14 RX ORDER — LIDOCAINE HYDROCHLORIDE 10 MG/ML
3 INJECTION, SOLUTION EPIDURAL; INFILTRATION; INTRACAUDAL; PERINEURAL
Status: COMPLETED | OUTPATIENT
Start: 2023-11-14 | End: 2023-11-14

## 2023-11-14 RX ORDER — POTASSIUM CHLORIDE 750 MG/1
1 CAPSULE, EXTENDED RELEASE ORAL DAILY
COMMUNITY
Start: 2023-10-12

## 2023-11-14 RX ADMIN — LIDOCAINE HYDROCHLORIDE 3 ML: 10 INJECTION, SOLUTION EPIDURAL; INFILTRATION; INTRACAUDAL; PERINEURAL at 10:53

## 2023-11-14 NOTE — PROGRESS NOTES
Procedure Note:    I discussed with the patient the potential benefits of performing a therapeutic injections as well as potential risks including but not limited to infection, swelling, pain, bleeding, bruising, nerve/vessel damage, skin color changes, transient elevation in blood glucose levels, and fat atrophy. After informed consent and after the areas were prepped with chlorhexadine soap, ethyl chloride was used to numb the skin. Via the superiorlateral approach, 3mL of 1% lidocaine followed by Orthovisc No. 1 were each injected into the right knee joint. The patient tolerated the procedure well. There were no complications. A sterile dressing was placed over the injection sites.      Follow-up: 1 week

## 2023-11-14 NOTE — PROGRESS NOTES
Procedure   - Large Joint Arthrocentesis: R knee on 11/14/2023 10:53 AM  Indications: pain  Details: 21 G needle, superolateral approach  Medications: 3 mL lidocaine PF 1% 1 %; 30 mg Hyaluronan 30 MG/2ML  Outcome: tolerated well, no immediate complications  Procedure, treatment alternatives, risks and benefits explained, specific risks discussed. Consent was given by the patient. Immediately prior to procedure a time out was called to verify the correct patient, procedure, equipment, support staff and site/side marked as required. Patient was prepped and draped in the usual sterile fashion.

## 2023-11-17 NOTE — PROGRESS NOTES
Procedure Note:    I discussed with the patient the potential benefits of performing a therapeutic injections as well as potential risks including but not limited to infection, swelling, pain, bleeding, bruising, nerve/vessel damage, skin color changes, transient elevation in blood glucose levels, and fat atrophy. After informed consent and after the areas were prepped with chlorhexadine soap, ethyl chloride was used to numb the skin. Via the superiorlateral approach, 3mL of 1% lidocaine followed by Orthovisc No. 2 were each injected into the right knee joint. The patient tolerated the procedure well. There were no complications. A sterile dressing was placed over the injection sites.      Follow-up: 1 week

## 2023-11-21 ENCOUNTER — CLINICAL SUPPORT (OUTPATIENT)
Dept: ORTHOPEDIC SURGERY | Facility: CLINIC | Age: 83
End: 2023-11-21
Payer: MEDICARE

## 2023-11-21 DIAGNOSIS — M17.11 PRIMARY OSTEOARTHRITIS OF RIGHT KNEE: Primary | ICD-10-CM

## 2023-11-21 RX ORDER — LIDOCAINE HYDROCHLORIDE 10 MG/ML
3 INJECTION, SOLUTION EPIDURAL; INFILTRATION; INTRACAUDAL; PERINEURAL
Status: COMPLETED | OUTPATIENT
Start: 2023-11-21 | End: 2023-11-21

## 2023-11-21 RX ADMIN — LIDOCAINE HYDROCHLORIDE 3 ML: 10 INJECTION, SOLUTION EPIDURAL; INFILTRATION; INTRACAUDAL; PERINEURAL at 08:14

## 2023-11-21 NOTE — PROGRESS NOTES
Procedure   - Large Joint Arthrocentesis: R knee on 11/21/2023 8:14 AM  Indications: pain  Details: 21 G needle, superolateral approach  Medications: 30 mg Hyaluronan 30 MG/2ML; 3 mL lidocaine PF 1% 1 %  Outcome: tolerated well, no immediate complications  Procedure, treatment alternatives, risks and benefits explained, specific risks discussed. Consent was given by the patient. Immediately prior to procedure a time out was called to verify the correct patient, procedure, equipment, support staff and site/side marked as required. Patient was prepped and draped in the usual sterile fashion.

## 2023-11-28 ENCOUNTER — CLINICAL SUPPORT (OUTPATIENT)
Dept: ORTHOPEDIC SURGERY | Facility: CLINIC | Age: 83
End: 2023-11-28
Payer: MEDICARE

## 2023-11-28 DIAGNOSIS — M17.11 PRIMARY OSTEOARTHRITIS OF RIGHT KNEE: Primary | ICD-10-CM

## 2023-11-28 PROCEDURE — 20610 DRAIN/INJ JOINT/BURSA W/O US: CPT | Performed by: ORTHOPAEDIC SURGERY

## 2023-11-28 RX ORDER — LIDOCAINE HYDROCHLORIDE 10 MG/ML
3 INJECTION, SOLUTION EPIDURAL; INFILTRATION; INTRACAUDAL; PERINEURAL
Status: COMPLETED | OUTPATIENT
Start: 2023-11-28 | End: 2023-11-28

## 2023-11-28 RX ADMIN — LIDOCAINE HYDROCHLORIDE 3 ML: 10 INJECTION, SOLUTION EPIDURAL; INFILTRATION; INTRACAUDAL; PERINEURAL at 08:12

## 2023-11-28 NOTE — PROGRESS NOTES
Procedure Note:    I discussed with the patient the potential benefits of performing a therapeutic injections as well as potential risks including but not limited to infection, swelling, pain, bleeding, bruising, nerve/vessel damage, skin color changes, transient elevation in blood glucose levels, and fat atrophy. After informed consent and after the areas were prepped with chlorhexadine soap, ethyl chloride was used to numb the skin. Via the superiorlateral approach, 3mL of 1% lidocaine followed by Orthovisc No. 3 were each injected into the right knee joint. The patient tolerated the procedure well. There were no complications. A sterile dressing was placed over the injection sites.      Follow-up: 4 months

## 2023-11-28 NOTE — PROGRESS NOTES
Procedure   - Large Joint Arthrocentesis: R knee on 11/28/2023 8:12 AM  Indications: pain  Details: 21 G needle, superolateral approach  Medications: 30 mg Hyaluronan 30 MG/2ML; 3 mL lidocaine PF 1% 1 %  Outcome: tolerated well, no immediate complications  Procedure, treatment alternatives, risks and benefits explained, specific risks discussed. Consent was given by the patient. Immediately prior to procedure a time out was called to verify the correct patient, procedure, equipment, support staff and site/side marked as required. Patient was prepped and draped in the usual sterile fashion.

## 2024-01-29 ENCOUNTER — TRANSCRIBE ORDERS (OUTPATIENT)
Dept: ADMINISTRATIVE | Facility: HOSPITAL | Age: 84
End: 2024-01-29
Payer: MEDICARE

## 2024-01-29 DIAGNOSIS — Z12.31 SCREENING MAMMOGRAM FOR BREAST CANCER: Primary | ICD-10-CM

## 2024-03-28 ENCOUNTER — OFFICE VISIT (OUTPATIENT)
Dept: ORTHOPEDIC SURGERY | Facility: CLINIC | Age: 84
End: 2024-03-28
Payer: MEDICARE

## 2024-03-28 VITALS
WEIGHT: 135 LBS | HEIGHT: 63 IN | BODY MASS INDEX: 23.92 KG/M2 | SYSTOLIC BLOOD PRESSURE: 110 MMHG | DIASTOLIC BLOOD PRESSURE: 74 MMHG

## 2024-03-28 DIAGNOSIS — M17.11 PRIMARY OSTEOARTHRITIS OF RIGHT KNEE: Primary | ICD-10-CM

## 2024-03-28 PROCEDURE — 3074F SYST BP LT 130 MM HG: CPT | Performed by: ORTHOPAEDIC SURGERY

## 2024-03-28 PROCEDURE — 3078F DIAST BP <80 MM HG: CPT | Performed by: ORTHOPAEDIC SURGERY

## 2024-03-28 PROCEDURE — 99212 OFFICE O/P EST SF 10 MIN: CPT | Performed by: ORTHOPAEDIC SURGERY

## 2024-03-28 NOTE — PROGRESS NOTES
"    [facility] [place] [facility] Note        Subjective     CC: Follow-up (4 month f/u --  Primary osteoarthritis of right knee. Finished visco series 11/28/2023)      HPI    Kaylan Craig is a 83 y.o. female.  Patient is here today for follow-up of her right knee arthritis.  Completed an Orthovisc series on 11/20/2023.  She says that her knee is doing okay except for some pulling posteriorly.  She says functionally she is doing everything she wants to do.    Overall, patient's symptoms are as above    ROS:    Constiutional:Pt denies fever, chills, nausea, or vomiting.  MSK:as above        Objective      Past Medical History  Past Medical History:   Diagnosis Date    Anemia     Blood Loss requiring blood transfusions during her hospitalizatoin at .     Arthritis     Arthritis of back     ASCUS favor benign     Atrophic vaginitis     Breast injury 10/2021    left breast bruising all over from falling on steps.    Disease of thyroid gland     Hypertension     Leg pain     Macular degeneration of both eyes     shots in both eyes monthly    Seasonal allergies     Wears glasses      Social History     Socioeconomic History    Marital status:     Number of children: 3   Tobacco Use    Smoking status: Never    Smokeless tobacco: Never   Vaping Use    Vaping status: Never Used   Substance and Sexual Activity    Alcohol use: No    Drug use: No    Sexual activity: Yes     Partners: Male     Birth control/protection: Tubal ligation, Same-sex partner          Physical Exam  /74   Ht 160 cm (62.99\")   Wt 61.2 kg (135 lb)   LMP  (LMP Unknown)   BMI 23.92 kg/m²     Body mass index is 23.92 kg/m².    Patient is well nourished and well developed.        Ortho Exam      Musculoskeletal:  Global Assessment:  Overall assessment of Lower Extremity Muscle Strength and Tone:  Right quadriceps--5/5   Right hamstrings--5/5       Right tibialis anterior--5/5  Right gastroc-soleus--5/5  Right EHL --5/5    Lower " Extremity:    Inspection and Palpation:  Right knee:  Tenderness:  Over the medial joint line and moderate severity  Effusion:  1+  Crepitus:  Positive  Pulses:  2+  Ecchymosis:  None  Warmth:  None     ROM:  Right:  Extension: 5    Flexion:120    Instability:    Right:  Lachman Test:  Negative   Varus stress test negative   Valgus stress test negative    Deformities/Malalignments/Discrepancies:    Left:  No deformities   Right:  Genu Varum    Functional Testing:  Aric's test:  Negative  Patella grind test:  Positive  Q-angle:  normal       Imaging/Labs/EMG Reviewed:  Imaging Results (Last 24 Hours)       ** No results found for the last 24 hours. **              Assessment    Assessment:  1. Primary osteoarthritis of right knee        Plan:  Recommend over the counter anti-inflammatories for pain and/or swelling  Right knee arthritis--steroid injection was offered today and the patient has politely declined.  She will call back if she seeks that.  She would like to stick with the gel for now.  This would put her sometime in late May early June.  I will see her then to reinitiate 3 shot Orthovisc series.      Santosh Xie MD  03/28/24  08:39 EDT      Dictated Utilizing Dragon Dictation.

## 2024-04-05 ENCOUNTER — HOSPITAL ENCOUNTER (OUTPATIENT)
Dept: MAMMOGRAPHY | Facility: HOSPITAL | Age: 84
Discharge: HOME | End: 2024-04-05
Admitting: GENERAL PRACTICE
Payer: MEDICARE

## 2024-04-05 DIAGNOSIS — Z12.31 SCREENING MAMMOGRAM FOR BREAST CANCER: ICD-10-CM

## 2024-04-05 PROCEDURE — 77063 BREAST TOMOSYNTHESIS BI: CPT

## 2024-04-05 PROCEDURE — 77067 SCR MAMMO BI INCL CAD: CPT

## 2024-05-30 ENCOUNTER — OFFICE VISIT (OUTPATIENT)
Dept: ORTHOPEDIC SURGERY | Facility: CLINIC | Age: 84
End: 2024-05-30
Payer: MEDICARE

## 2024-05-30 VITALS
SYSTOLIC BLOOD PRESSURE: 142 MMHG | WEIGHT: 118 LBS | DIASTOLIC BLOOD PRESSURE: 69 MMHG | BODY MASS INDEX: 20.91 KG/M2 | HEIGHT: 63 IN

## 2024-05-30 DIAGNOSIS — M17.11 PRIMARY OSTEOARTHRITIS OF RIGHT KNEE: Primary | ICD-10-CM

## 2024-05-30 RX ORDER — LIDOCAINE HYDROCHLORIDE 10 MG/ML
3 INJECTION, SOLUTION EPIDURAL; INFILTRATION; INTRACAUDAL; PERINEURAL
Status: COMPLETED | OUTPATIENT
Start: 2024-05-30 | End: 2024-05-30

## 2024-05-30 RX ORDER — POTASSIUM CHLORIDE 750 MG/1
1 TABLET, FILM COATED, EXTENDED RELEASE ORAL DAILY
COMMUNITY
Start: 2024-04-11

## 2024-05-30 RX ORDER — CALCIPOTRIENE 0.05 MG/ML
SOLUTION TOPICAL
COMMUNITY
Start: 2024-04-24

## 2024-05-30 RX ORDER — PREDNISONE 10 MG/1
TABLET ORAL
COMMUNITY
Start: 2024-05-28

## 2024-05-30 RX ADMIN — LIDOCAINE HYDROCHLORIDE 3 ML: 10 INJECTION, SOLUTION EPIDURAL; INFILTRATION; INTRACAUDAL; PERINEURAL at 08:49

## 2024-05-30 NOTE — PROGRESS NOTES
Procedure   - Large Joint Arthrocentesis on 5/30/2024 8:49 AM  Indications: pain  Medications: 3 mL lidocaine PF 1% 1 %; 30 mg Hyaluronan 30 MG/2ML  Outcome: tolerated well, no immediate complications  Procedure, treatment alternatives, risks and benefits explained, specific risks discussed. Immediately prior to procedure a time out was called to verify the correct patient, procedure, equipment, support staff and site/side marked as required. Patient was prepped and draped in the usual sterile fashion.

## 2024-05-30 NOTE — PROGRESS NOTES
[facility] [place] [facility] Note        Subjective     CC: Follow-up (2 month follow up--Primary osteoarthritis of right knee )      HPI    Kaylan Craig is a 83 y.o. female.  Patient is here today for follow-up of her right knee arthritis.  Continues to be active and go up and down steps without a lot of difficulty.  She has done well long-term with viscosupplementation.  Last series finished on 11/28/2023.    Overall, patient's symptoms are as above    ROS:    Constiutional:Pt denies fever, chills, nausea, or vomiting.  MSK:as above        Objective      Past Medical History  Past Medical History:   Diagnosis Date    Anemia     Blood Loss requiring blood transfusions during her hospitalizatoin at .     Arthritis     Arthritis of back     ASCUS favor benign     Atrophic vaginitis     Breast injury 10/2021    left breast bruising all over from falling on steps.    Disease of thyroid gland     Hypertension     Leg pain     Macular degeneration of both eyes     shots in both eyes monthly    Seasonal allergies     Wears glasses      Social History     Socioeconomic History    Marital status:     Number of children: 3   Tobacco Use    Smoking status: Never    Smokeless tobacco: Never   Vaping Use    Vaping status: Never Used   Substance and Sexual Activity    Alcohol use: No    Drug use: No    Sexual activity: Yes     Partners: Male     Birth control/protection: Tubal ligation, Same-sex partner          Assessment    Assessment:  1. Primary osteoarthritis of right knee        Plan:  Recommend over the counter anti-inflammatories for pain and/or swelling  Right knee arthritis--viscosupplementation will be initiated today.  Follow-up in a week for injection #2.      Santosh Xie MD  05/30/24  08:50 EDT      Dictated Utilizing Dragon Dictation.

## 2024-06-06 ENCOUNTER — CLINICAL SUPPORT (OUTPATIENT)
Dept: ORTHOPEDIC SURGERY | Facility: CLINIC | Age: 84
End: 2024-06-06
Payer: MEDICARE

## 2024-06-06 DIAGNOSIS — M17.11 PRIMARY OSTEOARTHRITIS OF RIGHT KNEE: Primary | ICD-10-CM

## 2024-06-06 NOTE — PROGRESS NOTES
CC: Follow-up right knee osteoarthritis, 2/3 Orthovisc injection today    History of present illness: Patient presents for her second Orthovisc injection to the right knee today.  At this time the patient denies any numbness or tingling into the distal extremity.  No fever, chills, night sweats or other constitutional symptoms.    See chart for PMH, PSH, Meds, All - reviewed.    Ortho exam:  Right knee  Skin: Intact without redness, warmth or swelling/effusion.  No lesions or evidence of infection noted.  Motor/sensory: Grossly intact L2-S1.    Assessment/plan:  Right knee osteoarthritis    Proceed today with 2/3 of Orthovisc injection.  Patient will follow-up in 1 week for third injection.      After discussing risks of injection the patient gave consent to proceed.  Her right knee was confirmed as the correct joint to be injected with a timeout.  The knee was then prepped with Hibiclens and injected with a prefilled syringe of Orthovisc without any resistance using anterior lateral approach, patient in seated position.  The patient tolerated procedure well.  Hemostasis was achieved and a Band-Aid was applied over the injection site.  I instructed the patient on signs and symptoms of infection.  They should report to the ED if any of these develop.  Recommended modifying activity to include rest, ice, elevation and/or heat along with oral pain medication as needed.  Patient observed ambulating normally after the injection.

## 2024-06-06 NOTE — PROGRESS NOTES
Procedure   Large Joint Arthrocentesis: R knee  Date/Time: 6/6/2024 8:56 AM  Consent given by: patient  Site marked: site marked  Timeout: Immediately prior to procedure a time out was called to verify the correct patient, procedure, equipment, support staff and site/side marked as required   Supporting Documentation  Indications: pain   Procedure Details  Location: knee - R knee  Preparation: Patient was prepped and draped in the usual sterile fashion  Needle gauge: 21g.  Approach: anterolateral  Medications administered: 30 mg Hyaluronan 30 MG/2ML  Patient tolerance: patient tolerated the procedure well with no immediate complications

## 2024-06-13 ENCOUNTER — CLINICAL SUPPORT (OUTPATIENT)
Dept: ORTHOPEDIC SURGERY | Facility: CLINIC | Age: 84
End: 2024-06-13
Payer: MEDICARE

## 2024-06-13 VITALS — BODY MASS INDEX: 20.9 KG/M2 | HEIGHT: 63 IN | WEIGHT: 117.95 LBS

## 2024-06-13 DIAGNOSIS — M17.11 PRIMARY OSTEOARTHRITIS OF RIGHT KNEE: Primary | ICD-10-CM

## 2024-06-13 RX ORDER — LIDOCAINE HYDROCHLORIDE 10 MG/ML
3 INJECTION, SOLUTION EPIDURAL; INFILTRATION; INTRACAUDAL; PERINEURAL
Status: COMPLETED | OUTPATIENT
Start: 2024-06-13 | End: 2024-06-13

## 2024-06-13 RX ADMIN — LIDOCAINE HYDROCHLORIDE 3 ML: 10 INJECTION, SOLUTION EPIDURAL; INFILTRATION; INTRACAUDAL; PERINEURAL at 13:23

## 2024-06-13 NOTE — PROGRESS NOTES
Procedure Note:    I discussed with the patient the potential benefits of performing a therapeutic injections as well as potential risks including but not limited to infection, swelling, pain, bleeding, bruising, nerve/vessel damage, skin color changes, transient elevation in blood glucose levels, and fat atrophy. After informed consent and after the areas were prepped with chlorhexadine soap, ethyl chloride was used to numb the skin. Via the superiorlateral approach, 3mL of 1% lidocaine followed by Orthovisc No.  3 were each injected into the right knee joint. The patient tolerated the procedure well. There were no complications. A sterile dressing was placed over the injection sites.      Follow-up: 6 months

## 2024-06-13 NOTE — PROGRESS NOTES
Procedure   - Large Joint Arthrocentesis: R knee on 6/13/2024 1:23 PM  Indications: pain  Details: (23g) needle, superolateral approach  Medications: 3 mL lidocaine PF 1% 1 %; 30 mg Hyaluronan 30 MG/2ML  Outcome: tolerated well, no immediate complications  Procedure, treatment alternatives, risks and benefits explained, specific risks discussed. Consent was given by the patient. Immediately prior to procedure a time out was called to verify the correct patient, procedure, equipment, support staff and site/side marked as required. Patient was prepped and draped in the usual sterile fashion.

## 2024-06-25 NOTE — PROGRESS NOTES
Physical Therapy Treatment    Patient Name:  Indira Waters   MRN:  [id number]    Recommendations:     Discharge Recommendations: Moderate Intensity Therapy (pt planning to d/c home with HH and assist from daughters)  Discharge Equipment Recommendations: walker, rolling, bedside commode  Barriers to discharge: Inaccessible home and Decreased caregiver support    Assessment:     Indira Waters is a 80 y.o. female admitted with a medical diagnosis of Acute respiratory failure with hypoxia and hypercapnia.  She presents with the following impairments/functional limitations: weakness, gait instability, pain, edema, impaired balance, impaired cardiopulmonary response to activity, impaired endurance, impaired functional mobility, impaired self care skills, decreased coordination, decreased lower extremity function, decreased ROM, decreased safety awareness.    Sit>stand from chair with rollator and SBA  Toilet transfer with rollator and SBA, step t/f  Amb 20' + 15' with rollator and SBA, 2L O2, decreased gait speed, alonso and B step length  Ascended/descended 4 steps with B HR and CGA  Pt with stable HR and SpO2 above 95% during session; c/o pain but good mobility and able to perform stair training  Rec Moderate Intensity Therapy (but pt planning to d/c home with HH and assist from daughters)    The mobility limitation cannot be sufficiently resolved by the use of a cane.   Patient's functional mobility deficit can be sufficiently resolved with the use of a rolling walker. Patient's mobility limitation significantly impairs their ability to participate in one of more activities of daily living. The use of a rolling walker will significantly improve the patient's ability to participate in MRADLS and the patient will use it on regular basis in the home.       Rehab Prognosis: Good; patient would benefit from acute skilled PT services to address these deficits and reach maximum level of function.    Recent  Procedure Note:    I discussed with the patient the potential benefits of performing a therapeutic injections as well as potential risks including but not limited to infection, swelling, pain, bleeding, bruising, nerve/vessel damage, skin color changes, transient elevation in blood glucose levels, and fat atrophy. After informed consent and after the areas were prepped with chlorhexadine soap, ethyl chloride was used to numb the skin. Via the superiorlateral approach, 3mL of 1% lidocaine followed by Orthovisc No. 2 were each injected into the right knee joint. The patient tolerated the procedure well. There were no complications. A sterile dressing was placed over the injection sites.     Surgery: * No surgery found *      Plan:     During this hospitalization, patient to be seen 5 x/week to address the identified rehab impairments via gait training, therapeutic activities, therapeutic exercises, neuromuscular re-education and progress toward the following goals:    Plan of Care Expires:  07/23/24    Subjective     Chief Complaint: pain  Patient/Family Comments/goals: daughter encouraging and motivating patient  Pain/Comfort:  Pain Rating 1: 10/10  Location 1:  (unspecified)  Pain Addressed 1: Reposition, Distraction, Cessation of Activity, Nurse notified  Pain Rating Post-Intervention 1: 10/10 (pt able to participate in session without problem)      Objective:     Communicated with nurse Alcantara prior to session.  Patient found up in chair with oxygen, peripheral IV, PureWick upon PT entry to room.     General Precautions: Standard, fall, diabetic, NPO  Orthopedic Precautions: N/A  Braces: N/A  Respiratory Status: Nasal cannula, flow 2 L/min     Functional Mobility:  Transfers:     Sit to Stand:  stand by assistance with 4 wheeled walker  Toilet Transfer: stand by assistance with  4 wheeled walker  using  Step Transfer  Gait: 20' + 15' with rollator and SBA, 2L O2, decreased gait speed, alonso and B step length  Stairs:  Pt ascended/descended 4] stair(s) with No Assistive Device with bilateral handrails with Contact Guard Assistance.       AM-PAC 6 CLICK MOBILITY  Turning over in bed (including adjusting bedclothes, sheets and blankets)?: 3  Sitting down on and standing up from a chair with arms (e.g., wheelchair, bedside commode, etc.): 3  Moving from lying on back to sitting on the side of the bed?: 3  Moving to and from a bed to a chair (including a wheelchair)?: 3  Need to walk in hospital room?: 3  Climbing 3-5 steps with a railing?: 2  Basic Mobility Total Score: 17       Treatment & Education:  Gait and stair training as noted    Patient left up in chair with all lines intact, call button in  reach, nurse Maricruz notified, and daughter present..    GOALS:   Multidisciplinary Problems       Physical Therapy Goals          Problem: Physical Therapy    Goal Priority Disciplines Outcome Goal Variances Interventions   Physical Therapy Goal     PT, PT/OT Progressing     Description: Goals to be met by: 2024    Patient will increase functional independence with mobility by performin. Sit<>stand with Min A with RW.  2. Gait x 40 feet with RW with CGA.  3. Supine<>sit with CGA.                           Time Tracking:     PT Received On: 24  PT Start Time: 1411     PT Stop Time: 1455  PT Total Time (min): 44 min     Billable Minutes: Gait Training 30 and Therapeutic Activity 14    Treatment Type: Treatment  PT/PTA: PTA     Number of PTA visits since last PT visit: 2     2024

## 2024-10-30 ENCOUNTER — OFFICE VISIT (OUTPATIENT)
Age: 84
End: 2024-10-30
Payer: MEDICARE

## 2024-10-30 VITALS
DIASTOLIC BLOOD PRESSURE: 70 MMHG | WEIGHT: 122 LBS | OXYGEN SATURATION: 97 % | HEIGHT: 63 IN | SYSTOLIC BLOOD PRESSURE: 138 MMHG | HEART RATE: 61 BPM | BODY MASS INDEX: 21.62 KG/M2

## 2024-10-30 DIAGNOSIS — E83.52 HYPERCALCEMIA: Primary | ICD-10-CM

## 2024-10-30 LAB
BILIRUB UR QL STRIP: NEGATIVE
CLARITY UR: CLEAR
COLOR UR: YELLOW
GLUCOSE UR STRIP-MCNC: NEGATIVE MG/DL
HGB UR QL STRIP.AUTO: NEGATIVE
KETONES UR QL STRIP: NEGATIVE
LEUKOCYTE ESTERASE UR QL STRIP.AUTO: ABNORMAL
NITRITE UR QL STRIP: NEGATIVE
PH UR STRIP.AUTO: 6 [PH] (ref 5–8)
PROT UR QL STRIP: NEGATIVE
SP GR UR STRIP: 1.02 (ref 1–1.03)
UROBILINOGEN UR QL STRIP: ABNORMAL

## 2024-10-30 PROCEDURE — 3078F DIAST BP <80 MM HG: CPT | Performed by: INTERNAL MEDICINE

## 2024-10-30 PROCEDURE — 82306 VITAMIN D 25 HYDROXY: CPT | Performed by: INTERNAL MEDICINE

## 2024-10-30 PROCEDURE — 81003 URINALYSIS AUTO W/O SCOPE: CPT | Performed by: INTERNAL MEDICINE

## 2024-10-30 PROCEDURE — 3075F SYST BP GE 130 - 139MM HG: CPT | Performed by: INTERNAL MEDICINE

## 2024-10-30 PROCEDURE — 99204 OFFICE O/P NEW MOD 45 MIN: CPT | Performed by: INTERNAL MEDICINE

## 2024-10-30 PROCEDURE — 80069 RENAL FUNCTION PANEL: CPT | Performed by: INTERNAL MEDICINE

## 2024-10-30 PROCEDURE — 84443 ASSAY THYROID STIM HORMONE: CPT | Performed by: INTERNAL MEDICINE

## 2024-10-30 PROCEDURE — 82164 ANGIOTENSIN I ENZYME TEST: CPT | Performed by: INTERNAL MEDICINE

## 2024-10-30 PROCEDURE — 82330 ASSAY OF CALCIUM: CPT | Performed by: INTERNAL MEDICINE

## 2024-10-30 PROCEDURE — 82652 VIT D 1 25-DIHYDROXY: CPT | Performed by: INTERNAL MEDICINE

## 2024-10-30 NOTE — ASSESSMENT & PLAN NOTE
This is non- parathyroid hormone mediated hypercalcemia    The differential diagnosis as to the cause is quite broad-- vit D over dose, excessive calcium intake, humoral hypercalcemia of malignancy, leukemia, lymphoma,  granulomatous disease, padget's disease of bone,  bone metastases./ hyperthyroidism     The first step is to check labs now that she is off of calcium/ usually cancers don't present this way as they are advanced by the time the calcium is high. The exception would be renal cell which can be quite subtle

## 2024-10-30 NOTE — PROGRESS NOTES
"     Office Note      Date: 10/30/2024  Patient Name: Kaylan Craig  MRN: [id number]  : 1940    Chief Complaint   Patient presents with    Abnormal Calcium       History of Present Illness:   Kaylan Craig is a 84 y.o. female who presents for Abnormal Calcium  .   Patient is seen for a new patient evaluation  Over the last 6 months she has had high blood calcium on a couple of occasions.   11.2 and 10.8  Pth level was low at 7.4  She was then told to stop taking calcium and vit d      She has been remarkably asymptomatic   No kidney stones   Subjective      P  Review of Systems:   Review of Systems   Constitutional:  Negative for fatigue and unexpected weight change.   HENT:  Negative for trouble swallowing and voice change.    Respiratory:  Negative for cough and shortness of breath.    Endocrine: Negative for polydipsia and polyuria.       The following portions of the patient's history were reviewed and updated as appropriate: allergies, current medications, past family history, past medical history, past social history, past surgical history, and problem list.    Objective     Visit Vitals  /70 (BP Location: Left arm, Patient Position: Sitting, Cuff Size: Adult)   Pulse 61   Ht 160 cm (63\")   Wt 55.3 kg (122 lb)   LMP  (LMP Unknown)   SpO2 97%   BMI 21.61 kg/m²           Physical Exam:  Physical Exam  Vitals reviewed.   Constitutional:       General: She is not in acute distress.     Appearance: Normal appearance. She is normal weight. She is not ill-appearing, toxic-appearing or diaphoretic.   HENT:      Head: Normocephalic and atraumatic.   Eyes:      Extraocular Movements: Extraocular movements intact.      Conjunctiva/sclera: Conjunctivae normal.      Pupils: Pupils are equal, round, and reactive to light.   Neck:      Thyroid: No thyroid mass, thyromegaly or thyroid tenderness.      Trachea: Trachea and phonation normal.   Cardiovascular:      Rate and Rhythm: Normal rate.      Pulses: Normal " pulses.      Heart sounds: Normal heart sounds.   Pulmonary:      Effort: Pulmonary effort is normal.   Abdominal:      General: There is no distension.      Palpations: There is mass.   Musculoskeletal:      Cervical back: Normal range of motion and neck supple.   Lymphadenopathy:      Cervical: No cervical adenopathy.   Skin:     General: Skin is warm and dry.      Coloration: Skin is not jaundiced.   Neurological:      General: No focal deficit present.      Mental Status: She is alert.   Psychiatric:         Mood and Affect: Mood normal.         Behavior: Behavior normal.         Thought Content: Thought content normal.         Judgment: Judgment normal.         Assessment / Plan      Assessment & Plan:  Problem List Items Addressed This Visit       Hypercalcemia - Primary    Overview     Over the last 6 months she has had high blood calcium on a couple of occasions.   11.2 and 10.8  Pth level was low at 7.4  She was then told to stop taking calcium and vit d          Current Assessment & Plan     This is non- parathyroid hormone mediated hypercalcemia    The differential diagnosis as to the cause is quite broad-- vit D over dose, excessive calcium intake, humoral hypercalcemia of malignancy, leukemia, lymphoma,  granulomatous disease, padget's disease of bone,  bone metastases./ hyperthyroidism     The first step is to check labs now that she is off of calcium/ usually cancers don't present this way as they are advanced by the time the calcium is high. The exception would be renal cell which can be quite subtle            Relevant Orders    Calcium, Ionized    Calcitriol (1,25 di-OH Vitamin D)    Vitamin D,25-Hydroxy    TSH    Renal Function Panel    Urinalysis without microscopic (no culture) - Urine, Clean Catch    Angiotensin Converting Enzyme        Electronically signed by  : Anton Pritchard MD   10/30/2024

## 2024-10-31 LAB
25(OH)D3 SERPL-MCNC: 27 NG/ML (ref 30–100)
ALBUMIN SERPL-MCNC: 4.4 G/DL (ref 3.5–5.2)
ANION GAP SERPL CALCULATED.3IONS-SCNC: 9.3 MMOL/L (ref 5–15)
BUN SERPL-MCNC: 26 MG/DL (ref 8–23)
BUN/CREAT SERPL: 28.9 (ref 7–25)
CA-I SERPL ISE-MCNC: 1.37 MMOL/L (ref 1.15–1.35)
CALCIUM SPEC-SCNC: 10.6 MG/DL (ref 8.6–10.5)
CHLORIDE SERPL-SCNC: 102 MMOL/L (ref 98–107)
CO2 SERPL-SCNC: 26.7 MMOL/L (ref 22–29)
CREAT SERPL-MCNC: 0.9 MG/DL (ref 0.57–1)
EGFRCR SERPLBLD CKD-EPI 2021: 64 ML/MIN/1.73
GLUCOSE SERPL-MCNC: 113 MG/DL (ref 65–99)
PHOSPHATE SERPL-MCNC: 3.7 MG/DL (ref 2.5–4.5)
POTASSIUM SERPL-SCNC: 4.2 MMOL/L (ref 3.5–5.2)
SODIUM SERPL-SCNC: 138 MMOL/L (ref 136–145)
TSH SERPL DL<=0.05 MIU/L-ACNC: 0.08 UIU/ML (ref 0.27–4.2)

## 2024-11-01 LAB — ACE SERPL-CCNC: 64 U/L (ref 14–82)

## 2024-11-04 ENCOUNTER — TELEPHONE (OUTPATIENT)
Dept: ENDOCRINOLOGY | Facility: CLINIC | Age: 84
End: 2024-11-04

## 2024-11-04 LAB — 1,25(OH)2D SERPL-MCNC: 28.7 PG/ML (ref 24.8–81.5)

## 2024-11-04 RX ORDER — LEVOTHYROXINE SODIUM 75 UG/1
75 TABLET ORAL DAILY
Qty: 30 TABLET | Refills: 2 | Status: SHIPPED | OUTPATIENT
Start: 2024-11-04

## 2024-11-04 NOTE — TELEPHONE ENCOUNTER
Please let her know that despite the fact that she stopped taking calcium, her calcium is still high. Based upon the labs I have back so far, it looks like the most likely cause is that she is on too much thyroid medication. We ought to reduce the dose of levothyroxine to 75 mcg per day and have her back in 6-8 weeks. If she is ok with that, let me know and I will put in the new rx. And please schedule her follow up with me or darcie     Spoke to pt-she voiced understanding andis ok to change.  Pended new dose and transferred to front office to schedule f/u

## 2024-11-20 ENCOUNTER — TELEPHONE (OUTPATIENT)
Dept: ORTHOPEDIC SURGERY | Facility: CLINIC | Age: 84
End: 2024-11-20
Payer: MEDICARE

## 2024-11-20 NOTE — TELEPHONE ENCOUNTER
1x call to reschedule her 12/13/2024 appointment. Dr Xie is not in the ofc that day.    HUB ok to reschedule to a f/u on 12/10 or 12/12.

## 2024-11-21 DIAGNOSIS — M17.11 PRIMARY OSTEOARTHRITIS OF RIGHT KNEE: Primary | ICD-10-CM

## 2024-12-13 ENCOUNTER — OFFICE VISIT (OUTPATIENT)
Age: 84
End: 2024-12-13
Payer: MEDICARE

## 2024-12-13 VITALS
HEIGHT: 63 IN | SYSTOLIC BLOOD PRESSURE: 126 MMHG | DIASTOLIC BLOOD PRESSURE: 72 MMHG | HEART RATE: 60 BPM | OXYGEN SATURATION: 100 % | BODY MASS INDEX: 21.97 KG/M2 | WEIGHT: 124 LBS

## 2024-12-13 DIAGNOSIS — E83.52 HYPERCALCEMIA: Primary | ICD-10-CM

## 2024-12-13 DIAGNOSIS — E03.9 HYPOTHYROIDISM (ACQUIRED): ICD-10-CM

## 2024-12-13 PROCEDURE — 80048 BASIC METABOLIC PNL TOTAL CA: CPT | Performed by: PHYSICIAN ASSISTANT

## 2024-12-13 PROCEDURE — 84443 ASSAY THYROID STIM HORMONE: CPT | Performed by: PHYSICIAN ASSISTANT

## 2024-12-13 NOTE — PROGRESS NOTES
"     Office Note      Date: 2024  Patient Name: Kaylan Craig  MRN: [id number]  : 1940    Chief Complaint   Patient presents with    Abnormal Calcium     Hypercalcemia         History of Present Illness:   Kaylan Craig is a 84 y.o. female who presents for Abnormal Calcium (Hypercalcemia/)  .   Patient was seen 2 months ago as a new patient  Over the last 6 months she has had high blood calcium on a couple of occasions.   11.2 and 10.8  Pth level was low at 7.4  She was then told to stop taking calcium and vit d      She has been remarkably asymptomatic   No kidney stones     Reports some fatigue and muscle loss with weight loss recently.  Her TSH was found to be low on screening labs at last visit, so her Synthroid dose was dropped to 75 mcg daily and she has been on this for over a month.  Hypercalcemia could be secondary to decreased TSH.    Subjective      Review of Systems:   Review of Systems   Constitutional:  Positive for fatigue. Negative for unexpected weight change.   HENT:  Negative for trouble swallowing and voice change.    Respiratory:  Negative for cough and shortness of breath.    Endocrine: Negative for polydipsia and polyuria.   Neurological:  Positive for weakness.       The following portions of the patient's history were reviewed and updated as appropriate: allergies, current medications, past family history, past medical history, past social history, past surgical history, and problem list.    Objective     Visit Vitals  /72 (BP Location: Left arm, Patient Position: Sitting, Cuff Size: Adult)   Pulse 60   Ht 160 cm (63\")   Wt 56.2 kg (124 lb)   LMP  (LMP Unknown)   SpO2 100%   BMI 21.97 kg/m²     Physical Exam  Vitals reviewed.   Constitutional:       General: She is not in acute distress.     Appearance: Normal appearance. She is normal weight. She is not ill-appearing, toxic-appearing or diaphoretic.   HENT:      Head: Normocephalic and atraumatic.   Eyes:      " Extraocular Movements: Extraocular movements intact.      Conjunctiva/sclera: Conjunctivae normal.      Pupils: Pupils are equal, round, and reactive to light.   Neck:      Thyroid: No thyroid mass, thyromegaly or thyroid tenderness.      Trachea: Trachea and phonation normal.   Cardiovascular:      Rate and Rhythm: Normal rate.      Pulses: Normal pulses.   Pulmonary:      Effort: Pulmonary effort is normal.   Musculoskeletal:      Cervical back: Normal range of motion and neck supple.   Lymphadenopathy:      Cervical: No cervical adenopathy.   Skin:     General: Skin is warm and dry.      Coloration: Skin is not jaundiced.   Neurological:      General: No focal deficit present.      Mental Status: She is alert.   Psychiatric:         Mood and Affect: Mood normal.         Behavior: Behavior normal.         Thought Content: Thought content normal.         Judgment: Judgment normal.       Office Visit on 10/30/2024   Component Date Value Ref Range Status    Ionized Calcium 10/30/2024 1.37 (H)  1.15 - 1.35 mmol/L Final    1,25-Dihydroxy, Vitamin D 10/30/2024 28.7  24.8 - 81.5 pg/mL Final    25 Hydroxy, Vitamin D 10/30/2024 27.0 (L)  30.0 - 100.0 ng/ml Final    TSH 10/30/2024 0.080 (L)  0.270 - 4.200 uIU/mL Final    Glucose 10/30/2024 113 (H)  65 - 99 mg/dL Final    BUN 10/30/2024 26 (H)  8 - 23 mg/dL Final    Creatinine 10/30/2024 0.90  0.57 - 1.00 mg/dL Final    Sodium 10/30/2024 138  136 - 145 mmol/L Final    Potassium 10/30/2024 4.2  3.5 - 5.2 mmol/L Final    Chloride 10/30/2024 102  98 - 107 mmol/L Final    CO2 10/30/2024 26.7  22.0 - 29.0 mmol/L Final    Calcium 10/30/2024 10.6 (H)  8.6 - 10.5 mg/dL Final    Albumin 10/30/2024 4.4  3.5 - 5.2 g/dL Final    Phosphorus 10/30/2024 3.7  2.5 - 4.5 mg/dL Final    Anion Gap 10/30/2024 9.3  5.0 - 15.0 mmol/L Final    BUN/Creatinine Ratio 10/30/2024 28.9 (H)  7.0 - 25.0 Final    eGFR 10/30/2024 64.0  >60.0 mL/min/1.73 Final    Color, UA 10/30/2024 Yellow  Yellow, Straw  Final    Appearance, UA 10/30/2024 Clear  Clear Final    pH, UA 10/30/2024 6.0  5.0 - 8.0 Final    Specific [place], UA 10/30/2024 1.021  1.005 - 1.030 Final    Glucose, UA 10/30/2024 Negative  Negative Final    Ketones, UA 10/30/2024 Negative  Negative Final    Bilirubin, UA 10/30/2024 Negative  Negative Final    Blood, UA 10/30/2024 Negative  Negative Final    Protein, UA 10/30/2024 Negative  Negative Final    Leuk Esterase, UA 10/30/2024 Trace (A)  Negative Final    Nitrite, UA 10/30/2024 Negative  Negative Final    Urobilinogen, UA 10/30/2024 0.2 E.U./dL  0.2 - 1.0 E.U./dL Final    Angiotensin Converting Enzyme 10/30/2024 64  14 - 82 U/L Final           Assessment / Plan      Diagnoses and all orders for this visit:    1. Hypercalcemia (Primary)  Assessment & Plan:  Recheck TSH and BMP today  If calcium is normalized, no further workup necessary    Orders:  -     Basic Metabolic Panel    2. Hypothyroidism (acquired)  Assessment & Plan:  Recheck TSH today on T4 75 mcg daily      Orders:  -     TSH         Electronically signed by  : Joo Mccullough PA-C   12/13/2024

## 2024-12-14 LAB
ANION GAP SERPL CALCULATED.3IONS-SCNC: 10.8 MMOL/L (ref 5–15)
BUN SERPL-MCNC: 21 MG/DL (ref 8–23)
BUN/CREAT SERPL: 22.8 (ref 7–25)
CALCIUM SPEC-SCNC: 9.9 MG/DL (ref 8.6–10.5)
CHLORIDE SERPL-SCNC: 100 MMOL/L (ref 98–107)
CO2 SERPL-SCNC: 31.2 MMOL/L (ref 22–29)
CREAT SERPL-MCNC: 0.92 MG/DL (ref 0.57–1)
EGFRCR SERPLBLD CKD-EPI 2021: 62.3 ML/MIN/1.73
GLUCOSE SERPL-MCNC: 93 MG/DL (ref 65–99)
POTASSIUM SERPL-SCNC: 4.1 MMOL/L (ref 3.5–5.2)
SODIUM SERPL-SCNC: 142 MMOL/L (ref 136–145)
TSH SERPL DL<=0.05 MIU/L-ACNC: 1.03 UIU/ML (ref 0.27–4.2)

## 2024-12-17 ENCOUNTER — OFFICE VISIT (OUTPATIENT)
Dept: ORTHOPEDIC SURGERY | Facility: CLINIC | Age: 84
End: 2024-12-17
Payer: MEDICARE

## 2024-12-17 VITALS
WEIGHT: 124 LBS | HEIGHT: 63 IN | BODY MASS INDEX: 21.97 KG/M2 | SYSTOLIC BLOOD PRESSURE: 126 MMHG | DIASTOLIC BLOOD PRESSURE: 70 MMHG

## 2024-12-17 DIAGNOSIS — M17.11 PRIMARY OSTEOARTHRITIS OF RIGHT KNEE: Primary | ICD-10-CM

## 2024-12-17 RX ORDER — LIDOCAINE HYDROCHLORIDE 10 MG/ML
3 INJECTION, SOLUTION EPIDURAL; INFILTRATION; INTRACAUDAL; PERINEURAL
Status: COMPLETED | OUTPATIENT
Start: 2024-12-17 | End: 2024-12-17

## 2024-12-17 RX ADMIN — LIDOCAINE HYDROCHLORIDE 3 ML: 10 INJECTION, SOLUTION EPIDURAL; INFILTRATION; INTRACAUDAL; PERINEURAL at 11:50

## 2024-12-17 NOTE — PROGRESS NOTES
[facility] [place]        Subjective     CC: Follow-up (6.5 month follow up--Primary osteoarthritis of right knee)      HPI    Kaylan Craig is a 84 y.o. female.  Patient is here today for follow-up of her right knee arthritis.  Is asking questions about arthroplasty which she has never done before.    Overall, patient's symptoms are worsening overall    ROS:    Constiutional:Pt denies fever, chills, nausea, or vomiting.  MSK:as above        Objective      Past Medical History  Past Medical History:   Diagnosis Date    Anemia     Blood Loss requiring blood transfusions during her hospitalizatoin at .     Arthritis     Arthritis of back     ASCUS favor benign     Atrophic vaginitis     Breast injury 10/2021    left breast bruising all over from falling on steps.    Disease of thyroid gland     Hypertension     Leg pain     Macular degeneration of both eyes     shots in both eyes monthly    Seasonal allergies     Wears glasses      Social History     Socioeconomic History    Marital status:     Number of children: 3   Tobacco Use    Smoking status: Never    Smokeless tobacco: Never   Vaping Use    Vaping status: Never Used   Substance and Sexual Activity    Alcohol use: No    Drug use: No    Sexual activity: Yes     Partners: Male     Birth control/protection: Tubal ligation, Partner of same sex          Assessment    Assessment:  1. Primary osteoarthritis of right knee        Plan:  Recommend over the counter anti-inflammatories for pain and/or swelling  Right knee arthritis--we tried to answer as many questions to the best of our ability today about arthroplasty.  I think she would do well if and when she chooses to get it done.  For now we will inject her with Orthovisc No. 1 and see her back next week for the following for Orthovisc No. 2      Procedure Note:    I discussed with the patient the potential benefits of performing a therapeutic injections as well as potential risks  including but not limited to infection, swelling, pain, bleeding, bruising, nerve/vessel damage, skin color changes, transient elevation in blood glucose levels, and fat atrophy. After informed consent and after the areas were prepped with chlorhexadine soap, ethyl chloride was used to numb the skin. Via the superiorlateral approach, 3mL of 1% lidocaine followed by Orthovisc #1 were each injected into the right knee joint. The patient tolerated the procedure well. There were no complications. A sterile dressing was placed over the injection sites.      Follow-up: 1 to 2 weeks        Santosh Xie MD  12/17/24  12:44 EST      Dictated Utilizing Dragon Dictation.

## 2024-12-17 NOTE — PROGRESS NOTES
Procedure   - Large Joint Arthrocentesis: R knee on 12/17/2024 11:50 AM  Indications: pain  Details: 21 G needle, anterolateral approach  Medications: 3 mL lidocaine PF 1% 1 %; 30 mg Hyaluronan 30 MG/2ML  Outcome: tolerated well, no immediate complications  Procedure, treatment alternatives, risks and benefits explained, specific risks discussed. Consent was given by the patient. Immediately prior to procedure a time out was called to verify the correct patient, procedure, equipment, support staff and site/side marked as required. Patient was prepped and draped in the usual sterile fashion.

## 2024-12-18 ENCOUNTER — TELEPHONE (OUTPATIENT)
Dept: ENDOCRINOLOGY | Facility: CLINIC | Age: 84
End: 2024-12-18

## 2024-12-31 ENCOUNTER — CLINICAL SUPPORT (OUTPATIENT)
Dept: ORTHOPEDIC SURGERY | Facility: CLINIC | Age: 84
End: 2024-12-31
Payer: MEDICARE

## 2024-12-31 DIAGNOSIS — M17.11 PRIMARY OSTEOARTHRITIS OF RIGHT KNEE: Primary | ICD-10-CM

## 2024-12-31 RX ORDER — LIDOCAINE HYDROCHLORIDE 10 MG/ML
3 INJECTION, SOLUTION EPIDURAL; INFILTRATION; INTRACAUDAL; PERINEURAL
Status: COMPLETED | OUTPATIENT
Start: 2024-12-31 | End: 2024-12-31

## 2024-12-31 RX ADMIN — LIDOCAINE HYDROCHLORIDE 3 ML: 10 INJECTION, SOLUTION EPIDURAL; INFILTRATION; INTRACAUDAL; PERINEURAL at 11:23

## 2024-12-31 NOTE — PROGRESS NOTES
Procedure Note:    I discussed with the patient the potential benefits of performing a therapeutic injections as well as potential risks including but not limited to infection, swelling, pain, bleeding, bruising, nerve/vessel damage, skin color changes, transient elevation in blood glucose levels, and fat atrophy. After informed consent and after the areas were prepped with chlorhexadine soap, ethyl chloride was used to numb the skin. Via the superiorlateral approach, 3mL of 1% lidocaine followed by Orthovisc #2 were each injected into the right knee joint. The patient tolerated the procedure well. There were no complications. A sterile dressing was placed over the injection sites.      Follow-up: 1 week

## 2024-12-31 NOTE — PROGRESS NOTES
Procedure   - Large Joint Arthrocentesis: R knee on 12/31/2024 11:23 AM  Indications: pain  Details: (23g) needle, superolateral approach  Medications: 3 mL lidocaine PF 1% 1 %; 30 mg Hyaluronan 30 MG/2ML  Outcome: tolerated well, no immediate complications  Procedure, treatment alternatives, risks and benefits explained, specific risks discussed. Consent was given by the patient. Immediately prior to procedure a time out was called to verify the correct patient, procedure, equipment, support staff and site/side marked as required. Patient was prepped and draped in the usual sterile fashion.

## 2025-01-07 ENCOUNTER — CLINICAL SUPPORT (OUTPATIENT)
Dept: ORTHOPEDIC SURGERY | Facility: CLINIC | Age: 85
End: 2025-01-07
Payer: MEDICARE

## 2025-01-07 ENCOUNTER — TELEPHONE (OUTPATIENT)
Dept: ORTHOPEDIC SURGERY | Facility: CLINIC | Age: 85
End: 2025-01-07

## 2025-01-07 DIAGNOSIS — M17.11 PRIMARY OSTEOARTHRITIS OF RIGHT KNEE: Primary | ICD-10-CM

## 2025-01-07 PROCEDURE — 20610 DRAIN/INJ JOINT/BURSA W/O US: CPT | Performed by: ORTHOPAEDIC SURGERY

## 2025-01-07 RX ORDER — LIDOCAINE HYDROCHLORIDE 10 MG/ML
3 INJECTION, SOLUTION EPIDURAL; INFILTRATION; INTRACAUDAL; PERINEURAL
Status: COMPLETED | OUTPATIENT
Start: 2025-01-07 | End: 2025-01-07

## 2025-01-07 RX ADMIN — LIDOCAINE HYDROCHLORIDE 3 ML: 10 INJECTION, SOLUTION EPIDURAL; INFILTRATION; INTRACAUDAL; PERINEURAL at 10:13

## 2025-01-07 NOTE — PROGRESS NOTES
Procedure Note:    I discussed with the patient the potential benefits of performing a therapeutic injections as well as potential risks including but not limited to infection, swelling, pain, bleeding, bruising, nerve/vessel damage, skin color changes, transient elevation in blood glucose levels, and fat atrophy. After informed consent and after the areas were prepped with chlorhexadine soap, ethyl chloride was used to numb the skin. Via the superiorlateral approach, 3mL of 1% lidocaine followed by Orthovisc No. 3 were each injected into the right knee joint. The patient tolerated the procedure well. There were no complications. A sterile dressing was placed over the injection sites.      Follow-up: End of April to get her ready for a trip to [place] first week of May.  Steroid injection will be given along with prescriptions for any medications she might need.

## 2025-01-07 NOTE — PROGRESS NOTES
Procedure   - Large Joint Arthrocentesis: R knee on 1/7/2025 10:13 AM  Indications: pain  Details: (23 G) needle, superolateral approach  Medications: 3 mL lidocaine PF 1% 1 %; 30 mg Hyaluronan 30 MG/2ML  Outcome: tolerated well, no immediate complications  Procedure, treatment alternatives, risks and benefits explained, specific risks discussed. Consent was given by the patient. Immediately prior to procedure a time out was called to verify the correct patient, procedure, equipment, support staff and site/side marked as required. Patient was prepped and draped in the usual sterile fashion.

## 2025-01-07 NOTE — TELEPHONE ENCOUNTER
Caller: Kaylan Craig    Relationship to patient: Self    Best call back number: [phone]    Chief complaint: RIGHT KNEE     Type of visit: ORTHOVISC INJECTION    Requested date: 010/07/25, LATER IN THE AFTERNOON     If rescheduling, when is the original appointment: 01/07/25     Additional notes:PATIENT ASKING IF SHE CAN COME IN LATER TODAY. HUB UNABLE TO R/S GEL INJECTIONS.

## 2025-01-09 NOTE — TELEPHONE ENCOUNTER
Rx Refill Note  Requested Prescriptions     Pending Prescriptions Disp Refills    levothyroxine (SYNTHROID, LEVOTHROID) 75 MCG tablet [Pharmacy Med Name: LEVOTHYROXINE 75 MCG TAB 75 Tablet] 90 tablet 0     Sig: TAKE 1 TABLET BY MOUTH DAILY. DECREASED DOSE      Last office visit with prescribing clinician: 10/30/2024     Next office visit with prescribing clinician: Patient will need to schedule an appointment for more refills.        Kristin Werner  01/09/25, 11:44 EST

## 2025-01-10 RX ORDER — LEVOTHYROXINE SODIUM 75 UG/1
75 TABLET ORAL DAILY
Qty: 90 TABLET | Refills: 0 | Status: SHIPPED | OUTPATIENT
Start: 2025-01-10

## 2025-03-04 ENCOUNTER — TRANSCRIBE ORDERS (OUTPATIENT)
Dept: ADMINISTRATIVE | Facility: HOSPITAL | Age: 85
End: 2025-03-04
Payer: MEDICARE

## 2025-03-04 DIAGNOSIS — Z12.31 SCREENING MAMMOGRAM FOR BREAST CANCER: Primary | ICD-10-CM

## 2025-04-22 ENCOUNTER — CLINICAL SUPPORT (OUTPATIENT)
Dept: ORTHOPEDIC SURGERY | Facility: CLINIC | Age: 85
End: 2025-04-22
Payer: MEDICARE

## 2025-04-22 VITALS
HEIGHT: 63 IN | BODY MASS INDEX: 23.21 KG/M2 | DIASTOLIC BLOOD PRESSURE: 74 MMHG | SYSTOLIC BLOOD PRESSURE: 124 MMHG | WEIGHT: 131 LBS

## 2025-04-22 DIAGNOSIS — M17.11 PRIMARY OSTEOARTHRITIS OF RIGHT KNEE: Primary | ICD-10-CM

## 2025-04-22 RX ORDER — TRIAMCINOLONE ACETONIDE 40 MG/ML
40 INJECTION, SUSPENSION INTRA-ARTICULAR; INTRAMUSCULAR
Status: COMPLETED | OUTPATIENT
Start: 2025-04-22 | End: 2025-04-22

## 2025-04-22 RX ORDER — LIDOCAINE HYDROCHLORIDE 10 MG/ML
3 INJECTION, SOLUTION EPIDURAL; INFILTRATION; INTRACAUDAL; PERINEURAL
Status: COMPLETED | OUTPATIENT
Start: 2025-04-22 | End: 2025-04-22

## 2025-04-22 RX ADMIN — LIDOCAINE HYDROCHLORIDE 3 ML: 10 INJECTION, SOLUTION EPIDURAL; INFILTRATION; INTRACAUDAL; PERINEURAL at 10:21

## 2025-04-22 RX ADMIN — TRIAMCINOLONE ACETONIDE 40 MG: 40 INJECTION, SUSPENSION INTRA-ARTICULAR; INTRAMUSCULAR at 10:21

## 2025-04-22 NOTE — PROGRESS NOTES
Procedure   - Large Joint Arthrocentesis: R knee on 4/22/2025 10:21 AM  Indications: pain  Details: (23g) needle, anterolateral approach  Medications: 3 mL lidocaine PF 1% 1 %; 40 mg triamcinolone acetonide 40 MG/ML  Outcome: tolerated well, no immediate complications  Procedure, treatment alternatives, risks and benefits explained, specific risks discussed. Consent was given by the patient. Immediately prior to procedure a time out was called to verify the correct patient, procedure, equipment, support staff and site/side marked as required. Patient was prepped and draped in the usual sterile fashion.

## 2025-04-22 NOTE — PROGRESS NOTES
[facility] [place]        Subjective     CC: Follow-up (3.5 month follow up - Primary osteoarthritis of right knee)      HPI    Kaylan Craig is a 84 y.o. female.  Patient is here for steroid injection prior to her trip to [place].    ROS:    Constiutional:Pt denies fever, chills, nausea, or vomiting.  MSK:as above        Objective      Past Medical History  Past Medical History:   Diagnosis Date    Anemia     Blood Loss requiring blood transfusions during her hospitalizatoin at .     Arthritis     Arthritis of back     ASCUS favor benign     Atrophic vaginitis     Breast injury 10/2021    left breast bruising all over from falling on steps.    Disease of thyroid gland     Hypertension     Leg pain     Macular degeneration of both eyes     shots in both eyes monthly    Seasonal allergies     Wears glasses      Social History     Socioeconomic History    Marital status:     Number of children: 3   Tobacco Use    Smoking status: Never     Passive exposure: Past    Smokeless tobacco: Never   Vaping Use    Vaping status: Never Used   Substance and Sexual Activity    Alcohol use: No    Drug use: No    Sexual activity: Yes     Partners: Male     Birth control/protection: Tubal ligation, Partner of same sex          Assessment    Assessment:  1. Primary osteoarthritis of right knee        Plan:  Recommend over the counter anti-inflammatories for pain and/or swelling  Right knee arthritis--steroid injection will be given today prior to her trip to [place].  We will see her back in early July for another round of viscosupplementation which will be ordered today.      Procedure Note:    I discussed with the patient the potential benefits of performing a therapeutic injections as well as potential risks including but not limited to infection, swelling, pain, bleeding, bruising, nerve/vessel damage, skin color changes, transient elevation in blood glucose levels, elevated blood pressure and fat  atrophy. After informed consent and after the areas were prepped with chlorhexadine soap, ethyl chloride was used to numb the skin. Via the anterolateral approach, 3mL of 1% lidocaine followed by 40mg of Kenalog were each injected into the right knee joint. The patient tolerated the procedure well. There were no complications. A sterile dressing was placed over the injection sites.        Santosh Xie MD  04/22/25  10:51 EDT      Dictated Utilizing Dragon Dictation.

## 2025-05-23 ENCOUNTER — HOSPITAL ENCOUNTER (OUTPATIENT)
Dept: MAMMOGRAPHY | Facility: HOSPITAL | Age: 85
Discharge: HOME | End: 2025-05-23
Admitting: NURSE PRACTITIONER
Payer: MEDICARE

## 2025-05-23 DIAGNOSIS — Z12.31 SCREENING MAMMOGRAM FOR BREAST CANCER: ICD-10-CM

## 2025-05-23 PROCEDURE — 77067 SCR MAMMO BI INCL CAD: CPT

## 2025-05-23 PROCEDURE — 77063 BREAST TOMOSYNTHESIS BI: CPT

## 2025-07-29 ENCOUNTER — OFFICE VISIT (OUTPATIENT)
Dept: ORTHOPEDIC SURGERY | Facility: CLINIC | Age: 85
End: 2025-07-29
Payer: MEDICARE

## 2025-07-29 VITALS
BODY MASS INDEX: 23.04 KG/M2 | DIASTOLIC BLOOD PRESSURE: 74 MMHG | WEIGHT: 130 LBS | SYSTOLIC BLOOD PRESSURE: 122 MMHG | HEIGHT: 63 IN

## 2025-07-29 DIAGNOSIS — M17.11 PRIMARY OSTEOARTHRITIS OF RIGHT KNEE: Primary | ICD-10-CM

## 2025-07-29 RX ORDER — POTASSIUM CHLORIDE 750 MG/1
10 CAPSULE, EXTENDED RELEASE ORAL DAILY
COMMUNITY
Start: 2025-07-22

## 2025-07-29 RX ORDER — LIDOCAINE HYDROCHLORIDE 10 MG/ML
3 INJECTION, SOLUTION EPIDURAL; INFILTRATION; INTRACAUDAL; PERINEURAL
Status: COMPLETED | OUTPATIENT
Start: 2025-07-29 | End: 2025-07-29

## 2025-07-29 RX ADMIN — LIDOCAINE HYDROCHLORIDE 3 ML: 10 INJECTION, SOLUTION EPIDURAL; INFILTRATION; INTRACAUDAL; PERINEURAL at 09:27

## 2025-07-29 NOTE — PROGRESS NOTES
[facility] Note        Subjective     CC: Follow-up (3 month follow-up: Primary osteoarthritis of right knee)      HPI    Kaylan Craig is a 85 y.o. female.  Patient is here today for follow-up of her right knee arthritis.  She is also here to start Orthovisc injection into the right knee.    Overall, patient's symptoms are as above    ROS:    Constiutional:Pt denies fever, chills, nausea, or vomiting.  MSK:as above        Objective      Past Medical History  Past Medical History:   Diagnosis Date    Anemia     Blood Loss requiring blood transfusions during her hospitalizatoin at .     Arthritis     Arthritis of back     ASCUS favor benign     Atrophic vaginitis     Breast injury 10/2021    left breast bruising all over from falling on steps.    Disease of thyroid gland     Hypertension     Leg pain     Macular degeneration of both eyes     shots in both eyes monthly    Seasonal allergies     Wears glasses      Social History     Socioeconomic History    Marital status:     Number of children: 3   Tobacco Use    Smoking status: Never     Passive exposure: Past    Smokeless tobacco: Never   Vaping Use    Vaping status: Never Used   Substance and Sexual Activity    Alcohol use: No    Drug use: No    Sexual activity: Yes     Partners: Male     Birth control/protection: Tubal ligation, Partner of same sex          Assessment    Assessment:  1. Primary osteoarthritis of right knee        Plan:  Recommend over the counter anti-inflammatories for pain and/or swelling  Right knee arthritis--begin Orthovisc series today.  See her back in a week for Orthovisc #2.      Procedure Note:    I discussed with the patient the potential benefits of performing a therapeutic injections as well as potential risks including but not limited to infection, swelling, pain, bleeding, bruising, nerve/vessel damage, skin color changes, transient elevation in blood glucose levels, elevated blood pressure and fat  atrophy. After informed consent and after the areas were prepped with chlorhexadine soap, ethyl chloride was used to numb the skin. Via the superiorlateral approach, 3mL of 1% lidocaine followed by Orthovisc # 1 were each injected into the right knee joint. The patient tolerated the procedure well. There were no complications. A sterile dressing was placed over the injection sites.      Follow-up: 1 week        Santosh Xie MD  07/29/25  09:46 EDT      Dictated Utilizing Dragon Dictation.Procedure   - Large Joint Arthrocentesis: R knee on 7/29/2025 9:27 AM  Indications: pain  Details: 21 G needle, superolateral approach  Medications: 3 mL lidocaine PF 1% 1 %; 30 mg Hyaluronan 30 MG/2ML  Outcome: tolerated well, no immediate complications  Procedure, treatment alternatives, risks and benefits explained, specific risks discussed. Consent was given by the patient. Immediately prior to procedure a time out was called to verify the correct patient, procedure, equipment, support staff and site/side marked as required. Patient was prepped and draped in the usual sterile fashion.

## 2025-08-07 ENCOUNTER — CLINICAL SUPPORT (OUTPATIENT)
Dept: ORTHOPEDIC SURGERY | Facility: CLINIC | Age: 85
End: 2025-08-07
Payer: MEDICARE

## 2025-08-07 DIAGNOSIS — M17.11 PRIMARY OSTEOARTHRITIS OF RIGHT KNEE: Primary | ICD-10-CM

## 2025-08-07 RX ORDER — LIDOCAINE HYDROCHLORIDE 10 MG/ML
3 INJECTION, SOLUTION EPIDURAL; INFILTRATION; INTRACAUDAL; PERINEURAL
Status: COMPLETED | OUTPATIENT
Start: 2025-08-07 | End: 2025-08-07

## 2025-08-07 RX ADMIN — LIDOCAINE HYDROCHLORIDE 3 ML: 10 INJECTION, SOLUTION EPIDURAL; INFILTRATION; INTRACAUDAL; PERINEURAL at 08:53

## 2025-08-14 ENCOUNTER — CLINICAL SUPPORT (OUTPATIENT)
Dept: ORTHOPEDIC SURGERY | Facility: CLINIC | Age: 85
End: 2025-08-14
Payer: MEDICARE

## 2025-08-14 DIAGNOSIS — M17.11 PRIMARY OSTEOARTHRITIS OF RIGHT KNEE: Primary | ICD-10-CM

## 2025-08-14 RX ORDER — LIDOCAINE HYDROCHLORIDE 10 MG/ML
3 INJECTION, SOLUTION EPIDURAL; INFILTRATION; INTRACAUDAL; PERINEURAL
Status: COMPLETED | OUTPATIENT
Start: 2025-08-14 | End: 2025-08-14

## 2025-08-14 RX ADMIN — LIDOCAINE HYDROCHLORIDE 3 ML: 10 INJECTION, SOLUTION EPIDURAL; INFILTRATION; INTRACAUDAL; PERINEURAL at 14:05

## (undated) DEVICE — KITTNER SPONGE: Brand: DEROYAL

## (undated) DEVICE — DIFFUSER: Brand: CORE, MAESTRO

## (undated) DEVICE — Device

## (undated) DEVICE — PK ATS CUST W CARDIOTOMY RESEVOIR

## (undated) DEVICE — ELECTRD BLD EZ CLN MOD XLNG 2.75IN

## (undated) DEVICE — 450 ML BOTTLE OF 0.05% CHLORHEXIDINE GLUCONATE IN 99.95% STERILE WATER FOR IRRIGATION, USP AND APPLICATOR.: Brand: IRRISEPT ANTIMICROBIAL WOUND LAVAGE

## (undated) DEVICE — 3M™ MEDIPORE™ H SOFT CLOTH SURGICAL TAPE, 2863, 3 IN X 10 YD, 12/CASE: Brand: 3M™ MEDIPORE™

## (undated) DEVICE — 3M™ STERI-DRAPE™ INSTRUMENT POUCH 1018: Brand: STERI-DRAPE™

## (undated) DEVICE — TB SXN FRAZIER 8F STRL

## (undated) DEVICE — ANTIBACTERIAL UNDYED BRAIDED (POLYGLACTIN 910), SYNTHETIC ABSORBABLE SUTURE: Brand: COATED VICRYL

## (undated) DEVICE — LO CONTOUR COLLAR: Brand: DEROYAL

## (undated) DEVICE — GLV SURG SIGNATURE TOUCH PF LTX 8 STRL BX/50

## (undated) DEVICE — OIL CARTRIDGE: Brand: CORE, MAESTRO

## (undated) DEVICE — PENCL ROCKRSWCH MEGADYNE W/HOLSTR 10FT SS

## (undated) DEVICE — GOWN,REINFORCE,POLY,SIRUS,BREATH SLV,XLG: Brand: MEDLINE

## (undated) DEVICE — CAUTERY TIP POLISHER: Brand: DEVON

## (undated) DEVICE — 4.0MM ROUND FLUTED AGGRESSIVE

## (undated) DEVICE — PATIENT RETURN ELECTRODE, SINGLE-USE, CONTACT QUALITY MONITORING, ADULT, WITH 9FT CORD, FOR PATIENTS WEIGING OVER 33LBS. (15KG): Brand: MEGADYNE

## (undated) DEVICE — PK SPINE ORTHO 10

## (undated) DEVICE — SNAP KOVER: Brand: UNBRANDED

## (undated) DEVICE — GLV SURG SENSICARE PI MIC PF SZ8 LF STRL

## (undated) DEVICE — GOWN,PREVENTION PLUS,XXLARGE,STERILE: Brand: MEDLINE